# Patient Record
Sex: FEMALE | Race: BLACK OR AFRICAN AMERICAN | NOT HISPANIC OR LATINO | Employment: STUDENT | URBAN - METROPOLITAN AREA
[De-identification: names, ages, dates, MRNs, and addresses within clinical notes are randomized per-mention and may not be internally consistent; named-entity substitution may affect disease eponyms.]

---
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
## 2025-01-08 ENCOUNTER — OFFICE VISIT (OUTPATIENT)
Dept: FAMILY MEDICINE | Facility: CLINIC | Age: 20
End: 2025-01-08

## 2025-01-08 VITALS
HEART RATE: 80 BPM | HEIGHT: 69 IN | WEIGHT: 241 LBS | BODY MASS INDEX: 35.7 KG/M2 | DIASTOLIC BLOOD PRESSURE: 74 MMHG | RESPIRATION RATE: 16 BRPM | OXYGEN SATURATION: 100 % | TEMPERATURE: 98.5 F | SYSTOLIC BLOOD PRESSURE: 120 MMHG

## 2025-01-08 DIAGNOSIS — F33.1 MODERATE EPISODE OF RECURRENT MAJOR DEPRESSIVE DISORDER (H): ICD-10-CM

## 2025-01-08 DIAGNOSIS — Z23 NEED FOR VACCINATION: ICD-10-CM

## 2025-01-08 DIAGNOSIS — M62.830 SPASM OF MUSCLE OF LOWER BACK: Primary | ICD-10-CM

## 2025-01-08 DIAGNOSIS — J06.9 VIRAL URI WITH COUGH: ICD-10-CM

## 2025-01-08 PROBLEM — R45.851 DEPRESSION WITH SUICIDAL IDEATION: Status: RESOLVED | Noted: 2018-05-31 | Resolved: 2025-01-08

## 2025-01-08 PROBLEM — F32.A DEPRESSION WITH SUICIDAL IDEATION: Status: RESOLVED | Noted: 2018-05-31 | Resolved: 2025-01-08

## 2025-01-08 PROBLEM — A60.00 GENITAL HERPES SIMPLEX TYPE 1 INFECTION: Status: RESOLVED | Noted: 2021-09-22 | Resolved: 2025-01-08

## 2025-01-08 NOTE — LETTER
January 8, 2025      Snehalbrice WATSON Kush  [address] [facility] [address]        To Whom It May Concern:    Yessy WATSON Kush was seen in our clinic on 1/8/2025. Please excuse her from work on 1/8/2025 due to illness.        Sincerely,        Broderick Bhatia MD

## 2025-01-08 NOTE — PROGRESS NOTES
[facility] Note    Yessy Currie is a 19 year old female here for back pain.    Assessment & Plan     Spasm of muscle of lower back  Patient presents with subacute onset of low back pain.  Based on history and exam, diagnosis of muscle spasm 2/2 overuse with poor lifting technique   Works in [employer] 12 hours 5 days/week, uses back muscles to lift once legs fatigue  No red flag symptoms/signs at this time  Recommend the following:   Referral to physical therapy  Work activity modification, note provided to patient  Reviewed proper lifting technique   No muscle relaxer needed, sleeping without issue  Discussed return and ED precautions   After shared decision making, patient agreeable with plan   - Physical Therapy  Referral; Future    Viral URI with cough  Stable with acute onset past 2-3 days.  Symptoms: productive cough, sore throat  Diagnosis: viral URI. Low suspicion for 2/2 bacterial infection.   Recommended the following:  Supportive management with plenty of fluids  OTC medications PRN for symptom relief   Return and ED precautions reviewed  After shared decision making, patient agreeable with plan     Moderate episode of recurrent major depressive disorder (H)  Stable and improved from prior w/o active treatment plan. No longer with SI.  Discussed treatment options, patient declined for now  Continue to monitor     Need for vaccination  Counseled patient regarding recommended vaccines   After shared decision making, patient agreeable with plan   - INFLUENZA VACCINE,SPLIT VIRUS,TRIVALENT,PF(FLUZONE)    Shared decision making done during visit, options, concerns, and plan reviewed with the patient. All relevant questions and concerns have been addressed. The patient expressed understanding and is agreeable with the above plan as discussed. AVS provided to patient during office visit via hardcopy and/or MyChart.    Follow-up: Return if symptoms worsen or fail to  improve.    Broderick Bhatia MD 1/8/2025 10:16 AM    Note to patient: The 21st Century Cures Act makes medical notes like this available to patients in the interest of transparency. However, be advised this is a medical document. It is intended as [id number] communication. It is written in medical language and may contain abbreviations or verbiage that are unfamiliar. It may appear blunt or direct. Medical documents are intended to carry relevant information, facts as evident, and the clinical opinion of the practitioner.        Rajesh Mattson is a 19 year old, presenting for the following health issues:  Vaginal Problem (Lump next to pelvic area, reports it has resolved, may have been razor burn) and Back Pain        1/8/2025    10:02 AM   Additional Questions   Roomed by Riya     History of Present Illness       Back Pain:  She presents for follow up of back pain. Patient's back pain is a new problem.    Original cause of back pain: turning/bending  First noticed back pain: more than 1 month ago  Patient feels back pain: comes and goesLocation of back pain:  Right lower back and left lower back  Description of back pain: cramping, sharp and stabbing  Back pain spreads: right buttocks and left buttocks    Since patient first noticed back pain, pain is: always present, but gets better and worse  Does back pain interfere with her job:  Yes  On a scale of 1-10 (10 being the worst), patient describes pain as:  6  What makes back pain worse: bending, certain positions, standing and twisting   Acupuncture: not tried  Acetaminophen: not tried  Activity or exercise: not helpful  Chiropractor:  Not tried  Cold: not helpful  Heat: helpful  Massage: not helpful  Muscle relaxants: not tried  NSAIDS: helpful  Opioids: not tried  Physical Therapy: not tried  Rest: helpful  Steroid Injection: not tried  Stretching: helpful  Surgery: not tried  TENS unit: not tried  Topical pain relievers: helpful  Other healthcare providers  "patient is seeing for back pain: None   She is taking medications regularly.     Additional history:  The back pain has been ongoing. 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   Constantly using bad technique to lift tires  No known injury or moment of sudden pain  When pain starts, takes 20-30 minutes to recover   Described as sharp and achy pain in low back  Able to sleep fine  Works at [employer]    Has a cold. Runny nose, congestion, cough. For about 2-3 days. She would like an anitbiotic because she is going out of town in several days. With sore throat, getting better. No known sick contacts. No fevers.   Denies headaches, body aches, chills.    Pelvic lump has resolved. Has very sensitvie skin. Gets razor burn when shaving often.     Review of Systems:  Please refer to HPI for pertinent positives and negatives.           Objective    /74   Pulse 80   Temp 98.5  F (36.9  C) (Oral)   Resp 16   Ht 1.753 m (5' 9\")   Wt 109.3 kg (241 lb)   LMP 10/06/2024 (Approximate)   SpO2 100%   BMI 35.59 kg/m    Body mass index is 35.59 kg/m .    Physical Exam  Vitals reviewed.   Constitutional:       General: She is not in acute distress.     Appearance: Normal appearance. She is not ill-appearing or diaphoretic.   HENT:      Head: Normocephalic and atraumatic.      Nose: Congestion present.      Mouth/Throat:      Mouth: Mucous membranes are moist.      Pharynx: Oropharynx is clear.   Eyes:      Conjunctiva/sclera: Conjunctivae normal.   Cardiovascular:      Rate and Rhythm: Normal rate and regular rhythm.      Heart sounds: Normal heart sounds.   Pulmonary:      Effort: Pulmonary effort is normal. No respiratory distress.      Breath sounds: Normal breath sounds. No wheezing, rhonchi or rales.   Musculoskeletal:      Cervical back: Normal.      Thoracic back: Normal.      Lumbar back: Spasms and tenderness present. No swelling, deformity, signs of trauma or bony tenderness. Normal " range of motion. Negative right straight leg raise test and negative left straight leg raise test.      Comments: Noted TTP to right sided paraspinal muscles of lumbar region.   Lymphadenopathy:      Cervical: Cervical adenopathy present.   Skin:     General: Skin is warm and dry.   Neurological:      Mental Status: She is alert.      Sensory: No sensory deficit.      Motor: No weakness.      Gait: Gait normal.   Psychiatric:         Mood and Affect: Mood normal.         Behavior: Behavior normal.            Signed Electronically by: Broderick Bhatia MD

## 2025-01-08 NOTE — LETTER
January 8, 2025      Yessy Currie  [address] [facility] [address]        To Whom It May Concern:    Yessy Currie was seen in our clinic on 1/8/2025. 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        Sincerely,      Broderick Bhatia      Electronically signed

## 2025-01-08 NOTE — PATIENT INSTRUCTIONS
Srinivasan,    Thank you for allowing [facility] to manage your care today.    Our plan is as follows:    I made a referral to physical therapy, they will be calling in approximately 1 week to set up your appointment. If you do not hear from them, please call the specialty number on your after visit.     If you have any questions or concerns, please feel free to call us at [phone].    Your partner in health,    Dr. Bhatia      Did you know?    You can schedule a video visit for follow-up appointments as well as future appointments for certain conditions. Please see the below link.     [email or web address]    If you have not already done so, I encourage you to sign up for [facility] ([email or web address]). This will allow you to review your results, securely communicate with a provider, and schedule virtual visits as well.

## 2025-01-21 ENCOUNTER — VIRTUAL VISIT (OUTPATIENT)
Dept: FAMILY MEDICINE | Facility: CLINIC | Age: 20
End: 2025-01-21
Payer: COMMERCIAL

## 2025-01-21 DIAGNOSIS — R22.2 MASS OF SKIN OF BACK: ICD-10-CM

## 2025-01-21 DIAGNOSIS — J06.9 VIRAL URI WITH COUGH: Primary | ICD-10-CM

## 2025-01-21 PROCEDURE — 98005 SYNCH AUDIO-VIDEO EST LOW 20: CPT | Performed by: STUDENT IN AN ORGANIZED HEALTH CARE EDUCATION/TRAINING PROGRAM

## 2025-01-21 ASSESSMENT — ENCOUNTER SYMPTOMS: COUGH: 1

## 2025-01-21 ASSESSMENT — PATIENT HEALTH QUESTIONNAIRE - PHQ9
SUM OF ALL RESPONSES TO PHQ QUESTIONS 1-9: 14
10. IF YOU CHECKED OFF ANY PROBLEMS, HOW DIFFICULT HAVE THESE PROBLEMS MADE IT FOR YOU TO DO YOUR WORK, TAKE CARE OF THINGS AT HOME, OR GET ALONG WITH OTHER PEOPLE: VERY DIFFICULT

## 2025-01-21 NOTE — LETTER
January 21, 2025      Snehalbrice WATSON Kush  [address] [facility] [address]        To Whom It May Concern:      Yessy WATSON Kush was seen in our clinic on 1/8/2025 for the following medical issues:   viral upper respiratory infection and cough with nasal congestion and body aches.         Sincerely,        Broderick Bhatia MD    Electronically signed

## 2025-01-21 NOTE — PROGRESS NOTES
Srinivasan is a 19 year old who is being evaluated via a billable video visit.    How would you like to obtain your AVS? [employer] [facility] [employer]  If the video visit is dropped, the invitation should be resent by: Text to cell phone: [phone]  Will anyone else be joining your video visit? Sue      Yessy Currie is a 19 year old female here for work note and painful skin lump.    Assessment & Plan     Viral URI with cough  Stable and improving past 2 weeks  Symptoms: dry cough   Diagnosis: viral URI. Low suspicion for 2/2 bacterial infection.   Recommended the following:  Supportive management with plenty of fluids  OTC medications PRN for symptom relief   Return precautions reviewed  After shared decision making, patient agreeable with plan   Needs work note sent via [employer]    Mass of skin of back  Patient presents with acute onset of tender skin mass of the mid back.  Based on history and exam, Ddx includes but is not limited to: abscess vs inflamed cyst vs other  Recommend the following:  Schedule procedure appt for evaluation   After shared decision making, patient agreeable with plan     Shared decision making done during visit, options, concerns, and plan reviewed with the patient. All relevant questions and concerns have been addressed. The patient expressed understanding and is agreeable with the above plan as discussed. AVS provided to patient via [employer].    Follow-up: Return in about 9 days (around 1/30/2025) for procedure visit.    [facility]  add-on complexity code:   The longitudinal plan of care for the diagnosis(es)/condition(s) as documented were addressed during this visit. Due to the added complexity in care, I will continue to support Srinivasan in the subsequent management and with ongoing continuity of care.     Broderick Bhatia MD 1/21/2025 4:04 PM    Note to patient: The 21st Century Cures Act makes medical notes like this available to patients in the interest of transparency. However, be advised this is a medical  document. It is intended as velc-bj-lmdo communication. It is written in medical language and may contain abbreviations or verbiage that are unfamiliar. It may appear blunt or direct. Medical documents are intended to carry relevant information, facts as evident, and the clinical opinion of the practitioner.         Subjective   Srinivasan is a 19 year old, presenting for the following health issues:  Cough        1/21/2025     3:30 PM   Additional Questions   Roomed by herminio mendes ma   Accompanied by self         1/21/2025     3:30 PM   Patient Reported Additional Medications   Patient reports taking the following new medications none       Video Start Time:  4:05 PM    HPI     #Cough  Patient states that she is currently having dry coughs, most of the other symptoms she had went away.   Denies fevers, sick contacts, SOB, chest pain  Needs work note     #Bug bite  She also mentioned that on her mid back, there looks to be a bug bite   Painful and squishy, feels like a big pimple  Denies fever, drainage    Review of Systems: Please refer to HPI for pertinent positives and negatives.           Objective       Vitals:  No vitals were obtained today due to virtual visit.    Physical Exam   GENERAL: alert and no distress  EYES: Eyes grossly normal to inspection.  No discharge or erythema, or obvious scleral/conjunctival abnormalities.  RESP: No audible wheeze, cough, or visible cyanosis.    SKIN: on mid left back visualized a small round mass ~1 cm, tender and fluctuant to patient's touch  NEURO: Cranial nerves grossly intact.  Mentation and speech appropriate for age.  PSYCH: Appropriate affect, tone, and pace of words        Video-Visit Details  Type of service:  Video Visit   Video End Time:4:24 PM  Originating Location (pt. Location): Home    Distant Location (provider location):  On-site  Platform used for Video Visit: Josh  Signed Electronically by: Broderick Bhatia MD

## 2025-01-29 ENCOUNTER — VIRTUAL VISIT (OUTPATIENT)
Dept: PSYCHIATRY | Facility: CLINIC | Age: 20
End: 2025-01-29
Payer: COMMERCIAL

## 2025-01-29 DIAGNOSIS — F32.A DEPRESSION, UNSPECIFIED DEPRESSION TYPE: ICD-10-CM

## 2025-01-29 DIAGNOSIS — F90.9 ATTENTION DEFICIT HYPERACTIVITY DISORDER (ADHD), UNSPECIFIED ADHD TYPE: ICD-10-CM

## 2025-01-29 DIAGNOSIS — G47.9 SLEEP DISTURBANCE: Primary | ICD-10-CM

## 2025-01-29 RX ORDER — CLONIDINE HYDROCHLORIDE 0.3 MG/1
0.3 TABLET ORAL AT BEDTIME
Qty: 30 TABLET | Refills: 2 | Status: SHIPPED | OUTPATIENT
Start: 2025-01-29

## 2025-01-29 ASSESSMENT — PAIN SCALES - GENERAL: PAINLEVEL_OUTOF10: NO PAIN (0)

## 2025-01-29 ASSESSMENT — PATIENT HEALTH QUESTIONNAIRE - PHQ9: SUM OF ALL RESPONSES TO PHQ QUESTIONS 1-9: 14

## 2025-01-29 NOTE — PATIENT INSTRUCTIONS
Medication Plan  -Discontinue clonidine 0.1mg ER at bedtime - never increased to 0.1mg BID  -Restart clonidine 0.3mg nightly. Recommend nightly use.    **For crisis resources, please see the information at the end of this document**   Patient Education    Thank you for coming to the [facility] [place] [facility].     Lab Testing:  If you had lab testing today and your results are reassuring or normal they will be mailed to you or sent through [employer] within 7 days. If the lab tests need quick action we will call you with the results. The phone number we will call with results is # [phone]. If this is not the best number please call our clinic and change the number.     Medication Refills:  If you need any refills please call your pharmacy and they will contact us.   Three business days of notice are needed for general medication refill requests.   Five business days of notice are needed for controlled substance refill requests.   If you need to change to a different pharmacy, please contact the new pharmacy directly. The new pharmacy will help you get your medications transferred.     Contact Us:  Please call [phone] during business hours (8-5:00 M-F).   If you have medication related questions after clinic hours, or on the weekend, please call [phone].     Financial Assistance [phone]   Medical Records [phone]       MENTAL HEALTH CRISIS RESOURCES:  For a emergency help, please call 911 or go to the nearest Emergency Department.     Emergency Walk-In Options:   [facility] Unit @ [facility] ([place]): [phone] - Specialized mental health emergency area designed to be calming  [facility] ([place]): [phone]  [facility] ([place]): [phone]  [facility]): [phone]    [facility] Crisis Information:   [place]: [phone]  [place]: [phone]  [place] ([place]) - Adult: [phone]      Child: [phone]  [place] - Adult: [phone]     Child: [phone]  [place]: [phone]  List of all [facility] resources:   https://[place].gov/dhs/people-we-serve/adults/health-care/mental-health/resources/crisis-contacts.jsp    National Crisis Information:   Crisis Text Line: Text  [place]  to 817831  Suicide & Crisis Lifeline: 988  National Suicide Prevention Lifeline: 4-625-135-TALK (1-603.827.5447)       For online chat options, visit https://suicidepreventionlifeline.org/chat/  Poison Control Center: 2-255-631-5547  Trans Lifeline: 1-855.480.7709 - Hotline for transgender people of all ages  The [employer] Project: 1-088-376-9434 - Hotline for LGBT youth     For Non-Emergency Support:   Fast Tracker: Mental Health & Substance Use Disorder Resources -   https://www.[employer].org/

## 2025-01-29 NOTE — PROGRESS NOTES
Virtual Visit Details    Type of service:  Video Visit   Video Start Time: 11:37A  Video End Time: 11:56A    Originating Location (pt. Location): Home    Distant Location (provider location):  Off-site  Platform used for Video Visit: Josh

## 2025-01-29 NOTE — NURSING NOTE
Current patient location: [address] [place] [id number]    Is the patient currently in the state of [place]? YES    Visit mode: VIDEO    If the visit is dropped, the patient can be reconnected by:VIDEO VISIT: Text to cell phone:   Telephone Information:   Mobile [phone]       Will anyone else be joining the visit? NO  (If patient encounters technical issues they should call [phone])    Are changes needed to the allergy or medication list? Pt stated no changes to allergies and Pt stated no med changes    Are refills needed on medications prescribed by this physician? Discuss with provider    Rooming Documentation:  Questionnaire(s) completed    Reason for visit: MAURY CELAYAF
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
## 2025-03-25 NOTE — PROGRESS NOTES
[facility]    Behavioral Health Clinician Progress Note   Mental Health & Addiction Services      2025    Patient Name: Yessy Currie       Service Type:  Individual   Service Location:  MyChart / Email (patient reached)   Visit Start Time: 11:11 AM  Visit End Time:  11:22 AM   Session Length: 11 min    Attendees: Patient   Service Modality: Video Visit:      Provider verified identity through the following two step process.  Patient provided:  Patient photo and Patient     Telemedicine Visit: The patient's condition can be safely assessed and treated via synchronous audio and visual telemedicine encounter.      Reason for Telemedicine Visit: Patient convenience (e.g. access to timely appointments / distance to available provider)    Originating Site (Patient Location): Patient's home    Distant Site (Provider Location): Provider Remote Setting- Home Office    Consent:  The patient/guardian has verbally consented to: the potential risks and benefits of telemedicine (video visit) versus in person care; bill my insurance or make self-payment for services provided; and responsibility for payment of non-covered services.     Patient would like the video invitation sent by:  My Chart    Mode of Communication:  Video Conference via [facility]    Distant Location (Provider):  Off-site    As the provider I attest to compliance with applicable laws and regulations related to telemedicine.   Visit number: 1    [facility] Visit Activities (Refresh list every visit): [facility] Only     [place] Diagnostic Assessment: 2024 by Alesha COOK   Treatment Plan Review Date: 2025      DATA:    Extended Session (60+ minutes): No   Interactive Complexity: No   Crisis: No   [facility] Patient: No     Assessments completed:  The following assessments were completed by patient for this visit:  PHQ2: No questionnaires on file.  PHQ9:       10/9/2024    12:28 PM  11/26/2024     9:08 AM 12/12/2024     1:11 PM 1/21/2025     3:37 PM 1/21/2025     3:40 PM 1/29/2025    11:19 AM 3/26/2025    11:01 AM   PHQ-9 SCORE   PHQ-9 Total Score Jamiehart 15 (Moderately severe depression) 15 (Moderately severe depression) 15 (Moderately severe depression) 14 (Moderate depression)   15 (Moderately severe depression)   PHQ-9 Total Score 15 15  15   18 14 15        Patient-reported     GAD2:       1/8/2025    11:11 AM   DANNIELLE-2   Feeling nervous, anxious, or on edge 1   Not being able to stop or control worrying 1   DANNIELLE-2 Total Score 2        Patient-reported     GAD7:       7/25/2018     1:41 PM 6/10/2019    10:14 AM 2/5/2020     9:37 AM 10/9/2024    12:29 PM 11/26/2024     9:09 AM 12/12/2024     1:12 PM 3/26/2025    11:01 AM   DANNIELLE-7 SCORE   Total Score    14 (moderate anxiety) 14 (moderate anxiety) 13 (moderate anxiety) 17 (severe anxiety)   Total Score 12 10 7 14 14  13  17        Patient-reported        Reason for Visit/Presenting Concern:  ADHD, Depression, and Insomnia    Current Stressors / Issues:   MH update: Pt reports that her anxiety has been high and she's feeling more irritated. [facility] explored triggers for anxious. Situational, RLS. The medication makes her tired so she's only taking it for sleep. [facility] explored how her attention interferes in her life. Impulsiveness, forgetful, procrastinates a lot. [facility] explored skills she's used to help with forgetfulness. Pt reports that she often has trouble falling and staying asleep which the clonodine helps with. Pt reports that her work is stressful, has been injured at work several times. She is actively looking for a different job. Barriers: lack of transportation so she uses ride sharing which is expensive.      Stresses: work, looking for a different job, needs to buy a car  Appetite: unremarkable  Sleep: trouble falling and staying asleep  Therapy: No, not comfortable in therapy.  KARLOS: No  Preg:  Most important: medication update       Therapeutic Interventions:  Cognitive Behavioral Therapy (CBT): Identified and challenged maladaptive patterns of behavior and thinking that interfere with patients life  Psycho-education: Provided psycho-education about patient's behavioral health condition and symptoms.  Mindfulness: Provided psycho-education around developing mindfulness strategies.     Response to treatment interventions:   Patient was receptive to interventions utilized.  Patient was engaged in the therapy process.       Progress on Treatment Objective(s) / Homework:   Unable to assess-first appt      Medication Review:   No changes to current psychiatric medication(s)     Medication Compliance:   Yes     Chemical Use Review:  Substance Use: Chemical use reviewed, no active concerns identified      Tobacco Use: No current tobacco use.       Assessment: Current Emotional / Mental Status (status of significant symptoms):    Risk status (Self / Other harm or suicidal ideation)   Patient denies a history of suicidal ideation, suicide attempts, self-injurious behavior, homicidal ideation, homicidal behavior, and and other safety concerns   Patient denies current fears or concerns for personal safety.   Patient denies current or recent suicidal ideation or behaviors.   Patient denies current or recent homicidal ideation or behaviors.   Patient denies current or recent self injurious behavior or ideation.   Patient denies other safety concerns.   Recommended that patient call 911 or go to the local ED should there be a change in any of these risk factors      ASSESSMENT:   Mental Status:     Appearance:   Appropriate    Eye Contact:   Good    Psychomotor Behavior: Normal    Attitude:   Cooperative    Orientation:   All   Speech Rate / Production: Normal    Volume:   Normal    Mood:    Depressed    Affect:    Appropriate    Thought Content:  Clear    Thought Form:  Coherent  Logical    Insight:    Good          Diagnoses:      Depression, major,  recurrent, moderate (H)  Attention deficit hyperactivity disorder (ADHD), unspecified ADHD type    Collateral Reports Completed:   Collaborated with [facility] team        Plan: (Homework, other):   Patient was provided No indications of CD issues  Patient was given information about behavioral services and encouraged to schedule a follow up appointment with the clinic [facility] in 1 month.         Meenu Quinteros, [facility], [facility]     _____________________________________________________________________________________________________________________________________                                              Individual Treatment Plan    Patient's Name: Yessy Currei   YOB: 2005  Date of Creation: 03/25/2025  Date Treatment Plan Last Reviewed/Revised: pending    DSM5 Diagnoses: Attention-Deficit/Hyperactivity Disorder  314.01 (F90.9) Unspecified Attention -Deficit / Hyperactivity Disorder or 296.32 (F33.1) Major Depressive Disorder, Recurrent Episode, Moderate With anxious distress  Psychosocial / Contextual Factors: Occupational Issues  PROMIS (reviewed every 90 days):   The following assessments were completed by patient for this visit:  PROMIS 10-Global Health (all questions and answers displayed):       11/26/2024     9:31 AM 3/26/2025    11:02 AM   PROMIS 10   In general, would you say your health is: Fair Very good   In general, would you say your quality of life is: Good Good   In general, how would you rate your physical health? Good Good   In general, how would you rate your mental health, including your mood and your ability to think? Good Good   In general, how would you rate your satisfaction with your social activities and relationships? Very good Fair   In general, please rate how well you carry out your usual social activities and roles Good Poor   To what extent are you able to carry out your everyday physical activities such as walking, climbing stairs, carrying groceries, or moving a chair?  Completely Mostly   In the past 7 days, how often have you been bothered by emotional problems such as feeling anxious, depressed, or irritable? Always Always   In the past 7 days, how would you rate your fatigue on average? Severe Severe   In the past 7 days, how would you rate your pain on average, where 0 means no pain, and 10 means worst imaginable pain? 6 7   In general, would you say your health is: 2 4   In general, would you say your quality of life is: 3 3   In general, how would you rate your physical health? 3 3   In general, how would you rate your mental health, including your mood and your ability to think? 3 3   In general, how would you rate your satisfaction with your social activities and relationships? 4 2   In general, please rate how well you carry out your usual social activities and roles. (This includes activities at home, at work and in your community, and responsibilities as a parent, child, spouse, employee, friend, etc.) 3 1   To what extent are you able to carry out your everyday physical activities such as walking, climbing stairs, carrying groceries, or moving a chair? 5 4   In the past 7 days, how often have you been bothered by emotional problems such as feeling anxious, depressed, or irritable? 5 5   In the past 7 days, how would you rate your fatigue on average? 4 4   In the past 7 days, how would you rate your pain on average, where 0 means no pain, and 10 means worst imaginable pain? 6 7   Global Mental Health Score 11  9    Global Physical Health Score 13  11    PROMIS TOTAL - SUBSCORES 24  20        Patient-reported     PROMIS 10-Global Health (only subscores and total score):       11/26/2024     9:31 AM 3/26/2025    11:02 AM   PROMIS-10 Scores Only   Global Mental Health Score 11  9    Global Physical Health Score 13  11    PROMIS TOTAL - SUBSCORES 24  20        Patient-reported        Referral / Collaboration:  Collaborated with [facility] team .    Anticipated number of session  for this episode of care:  6-9 sessions  Anticipation frequency of session: Monthly  Anticipated Duration of each session: 16-37 minutes  Treatment plan will be reviewed in 90 days or when goals have been changed.       MeasurableTreatment Goal(s) related to diagnosis / functional impairment(s)  Goal 1: Patient will reduce frequency and intensity of depressive episode(s).    I will know I've met my goal when I am feeling more chill.      Status: New - Date: 03/25/2025      Intervention(s)  [facility] will Cognitive Behavioral Therapy (CBT): Identify and challenge maladaptive patterns of behavior and thinking that interfere with patient's life  Psycho-education: Provide psycho-education about patient's behavioral health condition and symptoms.  Mindfulness: Provide psycho-education around developing mindfulness strategies.        Patient has reviewed and agreed to the above plan.     Written by  CARLYN Patton, [facility]

## 2025-03-26 ENCOUNTER — VIRTUAL VISIT (OUTPATIENT)
Dept: PSYCHIATRY | Facility: CLINIC | Age: 20
End: 2025-03-26
Payer: COMMERCIAL

## 2025-03-26 ENCOUNTER — VIRTUAL VISIT (OUTPATIENT)
Dept: BEHAVIORAL HEALTH | Facility: CLINIC | Age: 20
End: 2025-03-26
Payer: COMMERCIAL

## 2025-03-26 DIAGNOSIS — F90.9 ATTENTION DEFICIT HYPERACTIVITY DISORDER (ADHD), UNSPECIFIED ADHD TYPE: Primary | ICD-10-CM

## 2025-03-26 DIAGNOSIS — F33.1 DEPRESSION, MAJOR, RECURRENT, MODERATE (H): Primary | ICD-10-CM

## 2025-03-26 DIAGNOSIS — F32.A DEPRESSION, UNSPECIFIED DEPRESSION TYPE: ICD-10-CM

## 2025-03-26 DIAGNOSIS — F90.9 ATTENTION DEFICIT HYPERACTIVITY DISORDER (ADHD), UNSPECIFIED ADHD TYPE: ICD-10-CM

## 2025-03-26 PROCEDURE — 99207 PR NO BILLABLE SERVICE THIS VISIT: CPT | Mod: 95 | Performed by: SOCIAL WORKER

## 2025-03-26 RX ORDER — METHYLPHENIDATE HYDROCHLORIDE 18 MG/1
18 TABLET ORAL EVERY MORNING
Qty: 30 TABLET | Refills: 0 | Status: SHIPPED | OUTPATIENT
Start: 2025-03-26

## 2025-03-26 ASSESSMENT — ANXIETY QUESTIONNAIRES
8. IF YOU CHECKED OFF ANY PROBLEMS, HOW DIFFICULT HAVE THESE MADE IT FOR YOU TO DO YOUR WORK, TAKE CARE OF THINGS AT HOME, OR GET ALONG WITH OTHER PEOPLE?: VERY DIFFICULT
IF YOU CHECKED OFF ANY PROBLEMS ON THIS QUESTIONNAIRE, HOW DIFFICULT HAVE THESE PROBLEMS MADE IT FOR YOU TO DO YOUR WORK, TAKE CARE OF THINGS AT HOME, OR GET ALONG WITH OTHER PEOPLE: VERY DIFFICULT
6. BECOMING EASILY ANNOYED OR IRRITABLE: NEARLY EVERY DAY
7. FEELING AFRAID AS IF SOMETHING AWFUL MIGHT HAPPEN: SEVERAL DAYS
7. FEELING AFRAID AS IF SOMETHING AWFUL MIGHT HAPPEN: SEVERAL DAYS
GAD7 TOTAL SCORE: 17
5. BEING SO RESTLESS THAT IT IS HARD TO SIT STILL: MORE THAN HALF THE DAYS
3. WORRYING TOO MUCH ABOUT DIFFERENT THINGS: NEARLY EVERY DAY
GAD7 TOTAL SCORE: 17
4. TROUBLE RELAXING: NEARLY EVERY DAY
GAD7 TOTAL SCORE: 17
1. FEELING NERVOUS, ANXIOUS, OR ON EDGE: NEARLY EVERY DAY
2. NOT BEING ABLE TO STOP OR CONTROL WORRYING: MORE THAN HALF THE DAYS

## 2025-03-26 ASSESSMENT — PATIENT HEALTH QUESTIONNAIRE - PHQ9
10. IF YOU CHECKED OFF ANY PROBLEMS, HOW DIFFICULT HAVE THESE PROBLEMS MADE IT FOR YOU TO DO YOUR WORK, TAKE CARE OF THINGS AT HOME, OR GET ALONG WITH OTHER PEOPLE: VERY DIFFICULT
10. IF YOU CHECKED OFF ANY PROBLEMS, HOW DIFFICULT HAVE THESE PROBLEMS MADE IT FOR YOU TO DO YOUR WORK, TAKE CARE OF THINGS AT HOME, OR GET ALONG WITH OTHER PEOPLE: VERY DIFFICULT
SUM OF ALL RESPONSES TO PHQ QUESTIONS 1-9: 15

## 2025-03-26 ASSESSMENT — PAIN SCALES - GENERAL: PAINLEVEL_OUTOF10: NO PAIN (0)

## 2025-03-26 NOTE — NURSING NOTE
Is the patient currently in the state of [place]? YES    Current patient location: [facility] [address] [facility] [address]    Visit mode:Video    If the visit is dropped, the patient can be reconnected by: VIDEO VISIT: Text to cell phone:   Telephone Information:   Mobile [phone]       Will anyone else be joining the visit? No  (If patient encounters technical issues they should call [phone])    Are changes needed to the allergy or medication list? No    Are refills needed on medications prescribed by this physician? No    Rooming Documentation: Unable to complete qnrs due to time.    Reason for visit: RECHECK     Cara Gaston F      Care team has reviewed attendance agreement with patient. Patient advised that two failed appointments within 6 months may lead to termination of current episode of care.

## 2025-03-26 NOTE — PATIENT INSTRUCTIONS
Plan:  -Continue clonidine 0.3mg nightly  -Start Concerta 18mg every morning. Check your blood pressure at the pharmacy after starting this medication and immediately report any reading above 140/90.     **For crisis resources, please see the information at the end of this document**   Patient Education    Thank you for coming to the [facility] [place] [facility].     Lab Testing:  If you had lab testing today and your results are reassuring or normal they will be mailed to you or sent through in3Dgallery within 7 days. If the lab tests need quick action we will call you with the results. The phone number we will call with results is # [phone]. If this is not the best number please call our clinic and change the number.     Medication Refills:  If you need any refills please call your pharmacy and they will contact us.   Three business days of notice are needed for general medication refill requests.   Five business days of notice are needed for controlled substance refill requests.   If you need to change to a different pharmacy, please contact the new pharmacy directly. The new pharmacy will help you get your medications transferred.     Contact Us:  Please call [phone] during business hours (8-5:00 M-F).     Financial Assistance [phone]   Medical Records [phone]       MENTAL HEALTH CRISIS RESOURCES:  For a emergency help, please call 911 or go to the nearest Emergency Department.     Emergency Walk-In Options:   [facility] Unit @ [facility] ([place]): [phone] - Specialized mental health emergency area designed to be calming  [facility] ([place]): [phone]  [facility] ([place]): [phone]  [facility]): [phone]    [facility] Crisis Information:   [place]: [phone]  [place]: [phone]  [place] ([place]) - Adult: [phone]     Child: [phone]  [place] - Adult: [phone]      [facility]: [phone]  [place]: [phone]  List of all [facility] resources:   https://[place].gov/dhs/people-we-serve/adults/health-care/mental-health/resources/crisis-contacts.jsp    National Crisis Information:   Crisis Text Line: Text  [place]  to 510088  Suicide & Crisis Lifeline: 988  National Suicide Prevention Lifeline: 7-168-728-TALK (1-621.959.2050)       For online chat options, visit https://suicidepreventionlifeline.org/chat/  Poison Control Center: 8-105-853-2767  Trans Lifeline: 1-821.836.7754 - Hotline for transgender people of all ages  The [employer]: 1-291-148-0707 - Hotline for LGBT youth     For Non-Emergency Support:   Fast Tracker: Mental Health & Substance Use Disorder Resources -   https://www.[employer].org/

## 2025-03-26 NOTE — NURSING NOTE
Is the patient currently in the state of [place]? YES    Current patient location: [address] [place] [address]    Visit mode:Video    If the visit is dropped, the patient can be reconnected by: VIDEO VISIT: Text to cell phone:   Telephone Information:   Mobile [phone]       Will anyone else be joining the visit? No  (If patient encounters technical issues they should call [phone])    Are changes needed to the allergy or medication list? No    Are refills needed on medications prescribed by this physician? No    Rooming Documentation: Unable to complete qnrs due to time.    Reason for visit: RECHECK     MARLENE Richards

## 2025-03-26 NOTE — PROGRESS NOTES
Virtual Visit Details    Type of service:  Video Visit   Video Start Time: 1132  Video End Time: 1152    Originating Location (pt. Location): Home    Distant Location (provider location):  Off-site  Platform used for Video Visit: [email or web address]
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
## 2025-04-04 ENCOUNTER — OFFICE VISIT (OUTPATIENT)
Dept: FAMILY MEDICINE | Facility: CLINIC | Age: 20
End: 2025-04-04
Payer: COMMERCIAL

## 2025-04-04 VITALS
HEART RATE: 75 BPM | HEIGHT: 69 IN | RESPIRATION RATE: 18 BRPM | TEMPERATURE: 97.3 F | WEIGHT: 243.4 LBS | SYSTOLIC BLOOD PRESSURE: 124 MMHG | BODY MASS INDEX: 36.05 KG/M2 | OXYGEN SATURATION: 98 % | DIASTOLIC BLOOD PRESSURE: 76 MMHG

## 2025-04-04 DIAGNOSIS — Z30.09 ENCOUNTER FOR OTHER GENERAL COUNSELING OR ADVICE ON CONTRACEPTION: Primary | ICD-10-CM

## 2025-04-04 DIAGNOSIS — Z30.9 CONTRACEPTIVE MANAGEMENT: Primary | ICD-10-CM

## 2025-04-04 DIAGNOSIS — R51.9 NONINTRACTABLE EPISODIC HEADACHE, UNSPECIFIED HEADACHE TYPE: ICD-10-CM

## 2025-04-04 LAB
ALBUMIN SERPL BCG-MCNC: 4.5 G/DL (ref 3.5–5.2)
ALP SERPL-CCNC: 68 U/L (ref 40–150)
ALT SERPL W P-5'-P-CCNC: 19 U/L (ref 0–50)
ANION GAP SERPL CALCULATED.3IONS-SCNC: 11 MMOL/L (ref 7–15)
AST SERPL W P-5'-P-CCNC: 23 U/L (ref 0–35)
BILIRUB SERPL-MCNC: 0.5 MG/DL
BUN SERPL-MCNC: 12.9 MG/DL (ref 6–20)
CALCIUM SERPL-MCNC: 9.2 MG/DL (ref 8.8–10.4)
CHLORIDE SERPL-SCNC: 103 MMOL/L (ref 98–107)
CREAT SERPL-MCNC: 0.72 MG/DL (ref 0.51–0.95)
EGFRCR SERPLBLD CKD-EPI 2021: >90 ML/MIN/1.73M2
ERYTHROCYTE [DISTWIDTH] IN BLOOD BY AUTOMATED COUNT: 12.9 % (ref 10–15)
GLUCOSE SERPL-MCNC: 96 MG/DL (ref 70–99)
HCG UR QL: NEGATIVE
HCO3 SERPL-SCNC: 22 MMOL/L (ref 22–29)
HCT VFR BLD AUTO: 42.3 % (ref 35–47)
HGB BLD-MCNC: 13.5 G/DL (ref 11.7–15.7)
MCH RBC QN AUTO: 26.4 PG (ref 26.5–33)
MCHC RBC AUTO-ENTMCNC: 31.9 G/DL (ref 31.5–36.5)
MCV RBC AUTO: 83 FL (ref 78–100)
PLATELET # BLD AUTO: 191 10E3/UL (ref 150–450)
POTASSIUM SERPL-SCNC: 4.1 MMOL/L (ref 3.4–5.3)
PROT SERPL-MCNC: 7.6 G/DL (ref 6.4–8.3)
RBC # BLD AUTO: 5.12 10E6/UL (ref 3.8–5.2)
SODIUM SERPL-SCNC: 136 MMOL/L (ref 135–145)
TSH SERPL DL<=0.005 MIU/L-ACNC: 1.96 UIU/ML (ref 0.5–4.3)
WBC # BLD AUTO: 5.2 10E3/UL (ref 4–11)

## 2025-04-04 PROCEDURE — 90620 MENB-4C VACCINE IM: CPT

## 2025-04-04 PROCEDURE — 80053 COMPREHEN METABOLIC PANEL: CPT

## 2025-04-04 PROCEDURE — 85027 COMPLETE CBC AUTOMATED: CPT

## 2025-04-04 PROCEDURE — 90471 IMMUNIZATION ADMIN: CPT

## 2025-04-04 PROCEDURE — 36415 COLL VENOUS BLD VENIPUNCTURE: CPT

## 2025-04-04 PROCEDURE — 81025 URINE PREGNANCY TEST: CPT

## 2025-04-04 PROCEDURE — 84443 ASSAY THYROID STIM HORMONE: CPT

## 2025-04-04 PROCEDURE — 99214 OFFICE O/P EST MOD 30 MIN: CPT | Mod: 25

## 2025-04-04 ASSESSMENT — PAIN SCALES - GENERAL: PAINLEVEL_OUTOF10: MODERATE PAIN (4)

## 2025-04-04 NOTE — PROGRESS NOTES
"  Assessment & Plan     Contraceptive management  - Pregnancy test negative. Discussed different birth control options, she would like to have nexplanon removed. She is interested in the nuvaring but does not want to be sexually active with this. Discussed OCP for birth control, she is interested in the mini pill.  - HCG qualitative urine - CSC and Range    Nonintractable episodic headache, unspecified headache type  - Chronic, intermittent headaches. Denies known trauma.  - Comprehensive metabolic panel (BMP + Alb, Alk Phos, ALT, AST, Total. Bili, TP)  - TSH with free T4 reflex  - CBC with platelets          BMI  Estimated body mass index is 35.94 kg/m  as calculated from the following:    Height as of this encounter: 1.753 m (5' 9\").    Weight as of this encounter: 110.4 kg (243 lb 6.4 oz).   {Weight Management Plan needed for ACO:821807}      {FOLLOW UP PLANS (Optional) Includes COVID19 Treatment Plan:429395}    Rajesh Mattson is a 19 year old, presenting for the following health issues:  Contraception        4/4/2025     1:53 PM   Additional Questions   Roomed by Negra DURBIN MA     History of Present Illness       Reason for visit:  Pregnacy test She is missing 4 dose(s) of medications per week.  She is not taking prescribed medications regularly due to remembering to take.      Discuss contraception options, pt is wanting to get nexplanon removed.     Trying to lose weight, not possible with the nexplanon.    {SUPERLIST (Optional):728693}  {additonal problems for provider to add (Optional):870908}    {ROS Picklists (Optional):248948}      Objective    LMP 02/04/2025 (Approximate)   Breastfeeding No   There is no height or weight on file to calculate BMI.  Physical Exam   GENERAL: alert and no distress  NECK: no adenopathy, no asymmetry, masses, or scars  RESP: lungs clear to auscultation - no rales, rhonchi or wheezes  CV: regular rate and rhythm, normal S1 S2, no S3 or S4, no murmur, click or rub, no " peripheral edema  MS: no gross musculoskeletal defects noted, no edema    {Diagnostic Test Results (Optional):197435}  HANSEL Valerio      Signed Electronically by: JOEY Patton CNP  {Email feedback regarding this note to [email or web address]   :[id number]}

## 2025-04-04 NOTE — PATIENT INSTRUCTIONS
Schedule a virtual visit with Peter in a few weeks for evaluation of headaches     For Nexplanon removal schedule an appointment with Dr. Garcia, Dr. Daniels, Tracey Bailey in 3 months     Plan for Mini pill once nexplanon is removed. If you have any other questions please schedule a follow-up in person or virtual.

## 2025-04-05 ENCOUNTER — MYC MEDICAL ADVICE (OUTPATIENT)
Dept: FAMILY MEDICINE | Facility: CLINIC | Age: 20
End: 2025-04-05
Payer: COMMERCIAL

## 2025-04-05 NOTE — LETTER
April 8, 2025      Yessy Currie  [address] [facility] [address]        To Whom It May Concern:    Yessy Currie was seen in our clinic on 4/4/25.  Please excuse her from work due to illness.        Sincerely,        JOEY Patton CNP    Electronically signed

## 2025-04-08 ENCOUNTER — TELEPHONE (OUTPATIENT)
Dept: FAMILY MEDICINE | Facility: CLINIC | Age: 20
End: 2025-04-08

## 2025-04-08 NOTE — TELEPHONE ENCOUNTER
Forms/Letter Request    Type of form/letter: OTHER: work       Do we have the form/letter: Yes: Please see my chart message. It was supposed to be written by provider    Who is the form from? Peter Melo     Where did/will the form come from? Peter    When is form/letter needed by: asap. She also states she needs to speak with her care team about a follow up question from last visit. She would not give any other information     How would you like the form/letter returned: [employer]    Patient Notified form requests are processed in 5-7 business days:Yes    Could we send this information to you in [employer] or would you prefer to receive a phone call?:   Patient would like to be contacted via [employer]

## 2025-04-08 NOTE — TELEPHONE ENCOUNTER
Sent letter via [employer].   Discussed concerns with patient over the phone.   - wondering if new psych meds affect her birth control options. Advised they do not but methylphenidate can cause headaches. She will follow up with psych as planned and continue planw e discussed at last visit.

## 2025-04-09 ENCOUNTER — VIRTUAL VISIT (OUTPATIENT)
Dept: FAMILY MEDICINE | Facility: CLINIC | Age: 20
End: 2025-04-09
Payer: COMMERCIAL

## 2025-04-09 DIAGNOSIS — H53.9 VISION CHANGES: ICD-10-CM

## 2025-04-09 DIAGNOSIS — R11.0 NAUSEA: ICD-10-CM

## 2025-04-09 DIAGNOSIS — G43.109 MIGRAINE WITH AURA AND WITHOUT STATUS MIGRAINOSUS, NOT INTRACTABLE: Primary | ICD-10-CM

## 2025-04-09 PROCEDURE — 98005 SYNCH AUDIO-VIDEO EST LOW 20: CPT

## 2025-04-09 RX ORDER — SUMATRIPTAN SUCCINATE 25 MG/1
25 TABLET ORAL
Qty: 8 TABLET | Refills: 0 | Status: SHIPPED | OUTPATIENT
Start: 2025-04-09

## 2025-04-09 RX ORDER — ONDANSETRON 4 MG/1
4 TABLET, ORALLY DISINTEGRATING ORAL EVERY 8 HOURS PRN
Qty: 8 TABLET | Refills: 0 | Status: SHIPPED | OUTPATIENT
Start: 2025-04-09

## 2025-04-09 NOTE — PROGRESS NOTES
"Srinivasan is a 19 year old who is being evaluated via a billable video visit.    How would you like to obtain your AVS? MyChart  If the video visit is dropped, the invitation should be resent by: Text to cell phone: [phone]  Will anyone else be joining your video visit? No      Assessment & Plan     Migraine with aura and without status migrainosus, not intractable  - SUMAtriptan (IMITREX) 25 MG tablet  Dispense: 8 tablet; Refill: 0    Nausea  - ondansetron (ZOFRAN ODT) 4 MG ODT tab  Dispense: 8 tablet; Refill: 0    Vision changes  - Adult Eye  Referral            BMI  Estimated body mass index is 35.94 kg/m  as calculated from the following:    Height as of 4/4/25: 1.753 m (5' 9\").    Weight as of 4/4/25: 110.4 kg (243 lb 6.4 oz).         See Patient Instructions  Keep headache \"diary\" to track symptoms  Try medications for headaches:  Extra strength Tylenol 1000 mg (2 of the 500 MG tabs) (also called acetaminophen) - every 8 hours    Ibuprofen (motrin, advil) 800 mg (4 over the counter tablets) - every 8 hours    Try sumatriptan (aka imitrex)    Schedule with eye doctor.    Follow up in 3 months    Subjective   Srinivasan is a 19 year old, presenting for the following health issues:  RECHECK        4/9/2025    11:12 AM   Additional Questions   Roomed by Ruby Stroud Start Time:  see below    History of Present Illness       Reason for visit:  Pregnacy test She is missing 4 dose(s) of medications per week.  She is not taking prescribed medications regularly due to remembering to take.          Started end of 2023  On/off, infrequent but lasts long time, 2-5 times, last up to 3 days (only 1 time)  Symptoms variable  Front/one side  + Sound is muffled  +nausea, occasional vomiting   + Photophobia    Lays in dark room helps  Sometimes after sleeping goes away                  Review of Systems  Constitutional, HEENT, cardiovascular, pulmonary, gi and gu systems are negative, except as otherwise noted.    "   Objective           Vitals:  No vitals were obtained today due to virtual visit.    Physical Exam   GENERAL: alert and no distress  EYES: Eyes grossly normal to inspection.  No discharge or erythema, or obvious scleral/conjunctival abnormalities.  RESP: No audible wheeze, cough, or visible cyanosis.    SKIN: Visible skin clear. No significant rash, abnormal pigmentation or lesions.  NEURO: Cranial nerves grossly intact.  Mentation and speech appropriate for age.  PSYCH: Appropriate affect, tone, and pace of words    Office Visit on 04/04/2025   Component Date Value Ref Range Status    hCG Urine Qualitative 04/04/2025 Negative  Negative Final    This test is for screening purposes.  Results should be interpreted along with the clinical picture.  Confirmation testing is available if warranted by ordering [id number], HCG Quantitative Pregnancy.    Sodium 04/04/2025 136  135 - 145 mmol/L Final    Potassium 04/04/2025 4.1  3.4 - 5.3 mmol/L Final    Carbon Dioxide (CO2) 04/04/2025 22  22 - 29 mmol/L Final    Anion Gap 04/04/2025 11  7 - 15 mmol/L Final    Urea Nitrogen 04/04/2025 12.9  6.0 - 20.0 mg/dL Final    Creatinine 04/04/2025 0.72  0.51 - 0.95 mg/dL Final    GFR Estimate 04/04/2025 >90  >60 mL/min/1.73m2 Final    eGFR calculated using 2021 CKD-EPI equation.    Calcium 04/04/2025 9.2  8.8 - 10.4 mg/dL Final    Chloride 04/04/2025 103  98 - 107 mmol/L Final    Glucose 04/04/2025 96  70 - 99 mg/dL Final    Alkaline Phosphatase 04/04/2025 68  40 - 150 U/L Final    AST 04/04/2025 23  0 - 35 U/L Final    ALT 04/04/2025 19  0 - 50 U/L Final    Protein Total 04/04/2025 7.6  6.4 - 8.3 g/dL Final    Albumin 04/04/2025 4.5  3.5 - 5.2 g/dL Final    Bilirubin Total 04/04/2025 0.5  <=1.2 mg/dL Final    TSH 04/04/2025 1.96  0.50 - 4.30 uIU/mL Final    WBC Count 04/04/2025 5.2  4.0 - 11.0 10e3/uL Final    RBC Count 04/04/2025 5.12  3.80 - 5.20 10e6/uL Final    Hemoglobin 04/04/2025 13.5  11.7 - 15.7 g/dL Final    Hematocrit  04/04/2025 42.3  35.0 - 47.0 % Final    MCV 04/04/2025 83  78 - 100 fL Final    MCH 04/04/2025 26.4 (L)  26.5 - 33.0 pg Final    MCHC 04/04/2025 31.9  31.5 - 36.5 g/dL Final    RDW 04/04/2025 12.9  10.0 - 15.0 % Final    Platelet Count 04/04/2025 191  150 - 450 10e3/uL Final         Video-Visit Details  Joined the call at 4/9/2025, 11:39:22 am.  Left the call at 4/9/2025, 12:01:15 pm.  You were on the call for 21 minutes 53 seconds  Type of service:  Video Visit   Video End Time: above  Originating Location (pt. Location): Home    Distant Location (provider location):  Off-site  Platform used for Video Visit: Josh  Signed Electronically by: JOEY Patton CNP

## 2025-04-09 NOTE — PATIENT INSTRUCTIONS
"Keep headache \"diary\" to track symptoms  Try medications for headaches:  Extra strength Tylenol 1000 mg (2 of the 500 MG tabs) (also called acetaminophen) - every 8 hours    Ibuprofen (motrin, advil) 800 mg (4 over the counter tablets) - every 8 hours    Try sumatriptan (aka imitrex)    Schedule with eye doctor.    Follow up in 3 months        "

## 2025-04-25 ENCOUNTER — E-VISIT (OUTPATIENT)
Dept: FAMILY MEDICINE | Facility: CLINIC | Age: 20
End: 2025-04-25
Payer: COMMERCIAL

## 2025-04-25 DIAGNOSIS — G43.109 MIGRAINE WITH AURA AND WITHOUT STATUS MIGRAINOSUS, NOT INTRACTABLE: Primary | ICD-10-CM

## 2025-04-25 PROCEDURE — 99421 OL DIG E/M SVC 5-10 MIN: CPT

## 2025-04-25 RX ORDER — KETOROLAC TROMETHAMINE 30 MG/ML
60 INJECTION, SOLUTION INTRAMUSCULAR; INTRAVENOUS ONCE
Status: COMPLETED | OUTPATIENT
Start: 2025-04-25 | End: 2025-04-25

## 2025-04-25 NOTE — TELEPHONE ENCOUNTER
Provider E-Visit time total (minutes): {AMB PROVIDER EVISIT TOTAL MINUTES LIST:410758}    {Please send feedback regarding eVisits to [email or web address]}

## 2025-04-25 NOTE — LETTER
April 25, 2025      Yessy Currie  [address] [facility] [address]        To Whom It May Concern:    Please excuse Yessy Currie  from work on 4/25/25 due to illness.        Sincerely,        JOEY Patton CNP    Electronically signed

## 2025-04-25 NOTE — TELEPHONE ENCOUNTER
Provider E-Visit time total (minutes): {AMB PROVIDER EVISIT TOTAL MINUTES LIST:[id number]}    {Please send feedback regarding eVisits to [email or web address]}

## 2025-04-25 NOTE — TELEPHONE ENCOUNTER
Provider E-Visit time total (minutes): 6 minutes    Please call patient. Ordered toradol injection to be given ASAP in clinic today. .     Called pt and scheduled on MA schedule for injection. Negra DURBIN MA

## 2025-04-25 NOTE — PATIENT INSTRUCTIONS
Thank you for choosing us for your care. I have placed an order for a prescription so that you can start treatment:  Orders Placed This Encounter   Medications     ketorolac (TORADOL) injection 60 mg        View your full visit summary for details by clicking on the link below. Your pharmacist will able to address any questions you may have about the medication.     If you're not feeling better within 5-7 days, please schedule an appointment.  You can schedule an appointment right here in [facility], or call [phone]  If the visit is for the same symptoms as your eVisit, we'll refund the cost of your eVisit if seen within seven days.    Headache: Care Instructions  Overview     Headaches have many possible causes. Most headaches aren't a sign of a more serious problem, and they will get better on their own. Home treatment may help you feel better faster.  The doctor has checked you carefully, but problems can develop later. If you notice any problems or new symptoms, get medical treatment right away.  Follow-up care is a key part of your treatment and safety. Be sure to make and go to all appointments, and call your doctor if you are having problems. It's also a good idea to know your test results and keep a list of the medicines you take.  How can you care for yourself at home?  Rest in a quiet, dark room until your headache is gone. Close your eyes and try to relax or go to sleep. Don't watch TV or read.  Put a cold, moist cloth or cold pack on the painful area for 10 to 20 minutes at a time. Put a thin cloth between the cold pack and your skin.  Use a warm, moist towel or a heating pad set on low to relax tight shoulder and neck muscles.  Have someone gently massage your neck and shoulders.  Take pain medicines exactly as directed.  If the doctor gave you a prescription medicine for pain, take it as prescribed.  If you are not taking a prescription pain medicine, ask your doctor if you can take an  over-the-counter medicine.  Do not ignore new symptoms that occur with a headache, such as a fever, weakness or numbness, vision changes, or confusion. These may be signs of a more serious problem.  To prevent headaches  Keep a headache diary so you can figure out what triggers your headaches. Avoiding triggers may help you prevent headaches. Record when each headache began, how long it lasted, and what the pain was like (throbbing, aching, stabbing, or dull). Write down any other symptoms you had with the headache, such as nausea, flashing lights or dark spots, or sensitivity to bright light or loud noise. Note if the headache occurred near your period. List anything that might have triggered the headache, such as certain foods (chocolate, cheese, wine) or odors, smoke, bright light, stress, or lack of sleep.  Find healthy ways to deal with stress. Headaches are most common during or right after stressful times. Take time to relax before and after you do something that has caused a headache in the past.  Try to keep your muscles relaxed by keeping good posture. Check your jaw, face, neck, and shoulder muscles for tension, and try relaxing them. When sitting at a desk, change positions often, and stretch for 30 seconds each hour.  Get plenty of sleep and exercise.  Eat regularly. Long periods without food can trigger a headache.  Limit caffeine by not drinking too much coffee, tea, or soda. But don't quit caffeine suddenly, because that can also give you headaches.  Reduce eyestrain from computers by blinking frequently and looking away from the computer screen every so often. Make sure you have proper eyewear and that your monitor is set up properly, about an arm's length away.  When should you call for help?   Call 911 anytime you think you may need emergency care. For example, call if:    You have signs of a stroke. These may include:  Sudden numbness, paralysis, or weakness in your face, arm, or leg, especially  "on only one side of your body.  Sudden vision changes.  Sudden trouble speaking.  Sudden confusion or trouble understanding simple statements.  Sudden problems with walking or balance.  A sudden, severe headache that is different from past headaches.   Call your doctor now or seek immediate medical care if:    You have a fever and a stiff neck.     You have new nausea and vomiting, or you cannot keep down food or fluids.     Your headache gets much worse.   Watch closely for changes in your health, and be sure to contact your doctor if:    Your headaches get worse, happen more often, or change in some way.     You have new symptoms.     Your life is disrupted by your headaches. For example, you often miss work, school, or other activities.     You do not get better as expected.   Where can you learn more?  Go to https://www.[employer].net/patiented  Enter M271 in the search box to learn more about \"Headache: Care Instructions.\"  Current as of: December 3, 2024  Content Version: 14.4    7393-3905 [employer].   Care instructions adapted under license by your healthcare professional. If you have questions about a medical condition or this instruction, always ask your healthcare professional. [employer] disclaims any warranty or liability for your use of this information.    "

## 2025-05-07 ENCOUNTER — TELEPHONE (OUTPATIENT)
Dept: FAMILY MEDICINE | Facility: CLINIC | Age: 20
End: 2025-05-07

## 2025-05-07 ENCOUNTER — MYC MEDICAL ADVICE (OUTPATIENT)
Dept: FAMILY MEDICINE | Facility: CLINIC | Age: 20
End: 2025-05-07

## 2025-05-07 ENCOUNTER — VIRTUAL VISIT (OUTPATIENT)
Dept: FAMILY MEDICINE | Facility: CLINIC | Age: 20
End: 2025-05-07
Payer: COMMERCIAL

## 2025-05-07 DIAGNOSIS — G44.219 EPISODIC TENSION-TYPE HEADACHE, NOT INTRACTABLE: Primary | ICD-10-CM

## 2025-05-07 DIAGNOSIS — R11.11 VOMITING WITHOUT NAUSEA, UNSPECIFIED VOMITING TYPE: ICD-10-CM

## 2025-05-07 DIAGNOSIS — R20.2 RIGHT HAND PARESTHESIA: ICD-10-CM

## 2025-05-07 PROCEDURE — 98005 SYNCH AUDIO-VIDEO EST LOW 20: CPT

## 2025-05-07 ASSESSMENT — PATIENT HEALTH QUESTIONNAIRE - PHQ9
10. IF YOU CHECKED OFF ANY PROBLEMS, HOW DIFFICULT HAVE THESE PROBLEMS MADE IT FOR YOU TO DO YOUR WORK, TAKE CARE OF THINGS AT HOME, OR GET ALONG WITH OTHER PEOPLE: NOT DIFFICULT AT ALL
SUM OF ALL RESPONSES TO PHQ QUESTIONS 1-9: 0
SUM OF ALL RESPONSES TO PHQ QUESTIONS 1-9: 0

## 2025-05-07 ASSESSMENT — ENCOUNTER SYMPTOMS: HEADACHES: 1

## 2025-05-07 NOTE — PROGRESS NOTES
"Srinivasan is a 19 year old who is being evaluated via a billable video visit.    How would you like to obtain your AVS? MyChart  If the video visit is dropped, the invitation should be resent by: Text to cell phone: [phone]  Will anyone else be joining your video visit? No      Assessment & Plan     Episodic tension-type headache, not intractable  DDx most likely tension-type headache or migraine but given patient is reporting numbness and weakness in her right extremity and episode of vomiting without nausea, via video visit I am unable to check vital signs or physical exam so I advised her to present to ER. She is agreeable and will have her mom pick her up.     Right hand paresthesia  DDx includes atypical migraine symptom, radicular type neck pain, vs other. She has not had this symptoms with headaches in the past.     Vomiting without nausea, unspecified vomiting type  Unclear etiology. DDx includes migraine sequale vs other.             BMI  Estimated body mass index is 35.94 kg/m  as calculated from the following:    Height as of 4/4/25: 1.753 m (5' 9\").    Weight as of 4/4/25: 110.4 kg (243 lb 6.4 oz).             Subjective   Srinivasan is a 19 year old, presenting for the following health issues:  Headache        5/7/2025    12:29 PM   Additional Questions   Roomed by mian     Video Start Time: see below.    Headache     History of Present Illness       Reason for visit:  Headache, moving around. started in front and moved to back of neck. dull pain in head and sharp in back       She came to clinic for toradol injection with last migraine. That made her headache go away about 30 minutes later.     Her medication is not helping with this headache.     Tried sumatriptan 3 tabs yesterday and no improvement.   Has not taken tylenol or ibuprofen.    Headache started yesterday as dull pain in front of head, progressed to back of head and neck. Mild at first, after waking up after a nap it was more severe.     Now " there is sharp pain going up and down neck.   She is having a hard time standing up due to how bad the headache gets with position changes. She denies feeling dizzy today but did have some yesterday.     Threw up 1 time but no nausea.     Bright lights make it worse.   Loud noises make it worse.     Having numbness/weakness in left hand. Her  strength is weak.  Has never happened with a headache before.     Rates headache 8/10, comes and goes.       Of note pt started methylphenidate in past few months, we have discussed this can cause headaches but pt states she has not taken in a few weeks.     Migraine   Since your last clinic visit, how have your headaches changed?  Worsened  How often are you getting headaches or migraines? Started yesterday, took three imirtex and it did not help  Are you able to do normal daily activities when you have a migraine? No  Are you taking rescue/relief medications? (Select all that apply) sumatriptan (Imitrex)  How helpful is your rescue/relief medication?  I get no relief  Are you taking any medications to prevent migraines? (Select all that apply)  No  In the past 4 weeks, how often have you gone to urgent care or the emergency room because of your headaches?  0          Review of Systems  Constitutional, HEENT, cardiovascular, pulmonary, gi and gu systems are negative, except as otherwise noted.      Objective           Vitals:  No vitals were obtained today due to virtual visit.    Physical Exam   GENERAL: alert and no distress  EYES: Eyes grossly normal to inspection.  No discharge or erythema, or obvious scleral/conjunctival abnormalities.  RESP: No audible wheeze, cough, or visible cyanosis.    SKIN: Visible skin clear. No significant rash, abnormal pigmentation or lesions.  NEURO: Cranial nerves grossly intact.  Mentation and speech appropriate for age.  PSYCH: Appropriate affect, tone, and pace of words          Video-Visit Details  Joined the call at 5/7/2025, 1:38:15  pm.  Left the call at 5/7/2025, 1:52:27 pm.  You were on the call for 14 minutes 11 seconds.  Type of service:  Video Visit   Video End Time:1:52 PM  Originating Location (pt. Location): Home    Distant Location (provider location):  Off-site  Platform used for Video Visit: Josh  Signed Electronically by: JOEY Patton CNP

## 2025-05-07 NOTE — TELEPHONE ENCOUNTER
Patient called regarding migraine.  Had evisit on 4/25/25 for this and Toradol injection was ordered.  She stated that the injection was helpful, however, she developed another migraine yesterday.  She does not have a ride today to seek care.  Took 3 imitrex yesterday, last dose was before going to bed last night.  She does not feel that the imitrex is helping     She set up a video visit with PCP at 1:30 PM today to discuss further treatment, however, she is wondering if she can take 2 tabs (50 mg) of the imitrex now.  Explained that this would be more than prescribed for single dose so will need to ask PCP to advise    Sarah North RN  [facility]

## 2025-05-07 NOTE — LETTER
May 7, 2025      Yessy Currie  [address] [facility] [address]        To Whom It May Concern:    Yessy Currie  was seen on 5/7/25.  Please excuse her from work on 5/6/25-5/8/25 due to illness. She may return after that date without restrictions.        Sincerely,        JOEY Patton CNP    Electronically signed

## 2025-05-13 ENCOUNTER — VIRTUAL VISIT (OUTPATIENT)
Dept: PSYCHIATRY | Facility: CLINIC | Age: 20
End: 2025-05-13
Payer: COMMERCIAL

## 2025-05-13 ENCOUNTER — PATIENT OUTREACH (OUTPATIENT)
Dept: CARE COORDINATION | Facility: CLINIC | Age: 20
End: 2025-05-13
Payer: COMMERCIAL

## 2025-05-13 DIAGNOSIS — G47.9 SLEEP DISTURBANCE: ICD-10-CM

## 2025-05-13 DIAGNOSIS — F90.9 ATTENTION DEFICIT HYPERACTIVITY DISORDER (ADHD), UNSPECIFIED ADHD TYPE: Primary | ICD-10-CM

## 2025-05-13 DIAGNOSIS — F32.A DEPRESSION, UNSPECIFIED DEPRESSION TYPE: ICD-10-CM

## 2025-05-13 PROCEDURE — G2211 COMPLEX E/M VISIT ADD ON: HCPCS | Mod: 95

## 2025-05-13 PROCEDURE — 98006 SYNCH AUDIO-VIDEO EST MOD 30: CPT

## 2025-05-13 ASSESSMENT — ANXIETY QUESTIONNAIRES
6. BECOMING EASILY ANNOYED OR IRRITABLE: NEARLY EVERY DAY
IF YOU CHECKED OFF ANY PROBLEMS ON THIS QUESTIONNAIRE, HOW DIFFICULT HAVE THESE PROBLEMS MADE IT FOR YOU TO DO YOUR WORK, TAKE CARE OF THINGS AT HOME, OR GET ALONG WITH OTHER PEOPLE: VERY DIFFICULT
GAD7 TOTAL SCORE: 13
7. FEELING AFRAID AS IF SOMETHING AWFUL MIGHT HAPPEN: SEVERAL DAYS
GAD7 TOTAL SCORE: 13
4. TROUBLE RELAXING: NEARLY EVERY DAY
8. IF YOU CHECKED OFF ANY PROBLEMS, HOW DIFFICULT HAVE THESE MADE IT FOR YOU TO DO YOUR WORK, TAKE CARE OF THINGS AT HOME, OR GET ALONG WITH OTHER PEOPLE?: VERY DIFFICULT
2. NOT BEING ABLE TO STOP OR CONTROL WORRYING: SEVERAL DAYS
3. WORRYING TOO MUCH ABOUT DIFFERENT THINGS: MORE THAN HALF THE DAYS
5. BEING SO RESTLESS THAT IT IS HARD TO SIT STILL: SEVERAL DAYS
1. FEELING NERVOUS, ANXIOUS, OR ON EDGE: MORE THAN HALF THE DAYS
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
## 2025-05-13 NOTE — PATIENT INSTRUCTIONS
Plan:  -Continue clonidine 0.3mg nightly  -Remain off Concerta    **For crisis resources, please see the information at the end of this document**   Patient Education    Thank you for coming to the [facility] [facility].     Lab Testing:  If you had lab testing today and your results are reassuring or normal they will be mailed to you or sent through BitePal within 7 days. If the lab tests need quick action we will call you with the results. The phone number we will call with results is # [phone]. If this is not the best number please call our clinic and change the number.     Medication Refills:  If you need any refills please call your pharmacy and they will contact us.   Three business days of notice are needed for general medication refill requests.   Five business days of notice are needed for controlled substance refill requests.   If you need to change to a different pharmacy, please contact the new pharmacy directly. The new pharmacy will help you get your medications transferred.     Contact Us:  Please call [phone] during business hours (8-5:00 M-F).   Financial Assistance [phone]   Medical Records [phone]       MENTAL HEALTH CRISIS RESOURCES:  For a emergency help, please call 911 or go to the nearest Emergency Department.     Emergency Walk-In Options:   EmPATH Unit @ [facility] ([place]): [phone] - Specialized mental health emergency area designed to be calming  [facility] ([place]): [phone]  [facility] ([place]): [phone]  [facility]): [phone]    County Crisis Information:   [place]: [phone]  [place]: [phone]  [place] ([place]) - Adult: [phone]     Child: [phone]  [place] - Adult: [phone]     Child: [phone]  [place]: [phone]  List of all [facility] resources:    https://[place].gov/dhs/people-we-serve/adults/health-care/mental-health/resources/crisis-contacts.jsp    National Crisis Information:   Crisis Text Line: Text  [place]  to [address]  Suicide & Crisis Lifeline: 988  National Suicide Prevention Lifeline: 9-613-700-TALK (1-934.677.2783)       For online chat options, visit https://suicidepreventionlifeline.org/chat/  Poison Control Center: 3-854-969-7031  Trans Lifeline: 9-262-264-7642 - Hotline for transgender people of all ages  The Toni Project: 7-073-605-6861 - Hotline for LGBT youth     For Non-Emergency Support:   Fast Tracker: Mental Health & Substance Use Disorder Resources -   https://www.MailInBlackckWalkMen.org/

## 2025-05-22 ENCOUNTER — VIRTUAL VISIT (OUTPATIENT)
Dept: FAMILY MEDICINE | Facility: CLINIC | Age: 20
End: 2025-05-22
Payer: COMMERCIAL

## 2025-05-22 DIAGNOSIS — Z56.9: ICD-10-CM

## 2025-05-22 DIAGNOSIS — M25.511 ACUTE PAIN OF RIGHT SHOULDER: ICD-10-CM

## 2025-05-22 DIAGNOSIS — G89.29 CHRONIC RIGHT-SIDED LOW BACK PAIN WITH RIGHT-SIDED SCIATICA: Primary | ICD-10-CM

## 2025-05-22 DIAGNOSIS — M54.41 CHRONIC RIGHT-SIDED LOW BACK PAIN WITH RIGHT-SIDED SCIATICA: Primary | ICD-10-CM

## 2025-05-22 RX ORDER — METHOCARBAMOL 500 MG/1
1000 TABLET, FILM COATED ORAL
Qty: 30 TABLET | Refills: 0 | Status: SHIPPED | OUTPATIENT
Start: 2025-05-22

## 2025-05-22 SDOH — ECONOMIC STABILITY - INCOME SECURITY: UNSPECIFIED PROBLEMS RELATED TO EMPLOYMENT: Z56.9

## 2025-05-22 NOTE — LETTER
May 22, 2025      Yessy Currie  [address] [place] [facility] [address]        To Whom It May Concern:      Yessy Currie was seen in our clinic on 5/22/2025. She may return to work with the following restrictions: limited to light duty - lifting no greater than 10 pounds, no use of arms over shoulders, and no bending/stooping, must be able to take a break for 5 mins every 1-2 hrs to rest and stretch as needed, and special equipment needed: back brace for lumbar support. The restrictions should remain in place from 5/22/2025 until 8/22/2025.      Sincerely,      Broderick Bhatia MD          Electronically signed

## 2025-05-22 NOTE — LETTER
2025    Yessy Curire   2005        To Whom it May Concern;    Please excuse Yessy Currie from work for a healthcare visit on May 22, 2025.            Sincerely,        Broderick Bhatia MD      Electronically signed

## 2025-05-22 NOTE — PATIENT INSTRUCTIONS
Srinivasan,    Thank you for allowing [facility] to manage your care today.    Our plan is as follows:    I sent your prescriptions to your pharmacy.  Start methocarbamol (ROBAXIN) 500 MG tablet; Take 2 tablets (1,000 mg) by mouth nightly as needed for muscle spasms.    I ordered MRI imaging, please call diagnostic imaging [phone] to schedule your appointment.    I made a referral to sports medicine and social work, they should be calling in a few days to a week to set up your appointment.   If you do not hear from them, please call the specialty number on your after visit summary.       If you have any questions or concerns, please use [employer]ropper and/or feel free to call us at [phone].    Your partner in health,    Dr. Bhatia

## 2025-05-22 NOTE — PROGRESS NOTES
Srinivasan is a 19 year old who is being evaluated via a billable video visit.    How would you like to obtain your AVS? MyChart  If the video visit is dropped, the invitation should be resent by: Text to cell phone: [phone]  Will anyone else be joining your video visit? Sue      Jarrettcristinabrice Currie is a 19 year old female here for pain concerns.    Assessment & Plan     Chronic right-sided low back pain with right-sided sciatica  Worsening since onset 6 months ago after starting labor intensive job in [employer] working long hours.   Patient tried physical therapy but did not have enough time in her schedule to complete treatment.   Recommend the following:  Given worsening sciatica pain and impact on daily function, will proceed with lumbar MRI  Pending MRI results, likely refer to spine clinic for evaluation and optimization   Trial muscle relaxer to help with pain at night and sleep  Activity modification, work note provided  Discussed job change if possible to prevent working of back issues and long term health   After shared decision making, patient agreeable with plan   - methocarbamol (ROBAXIN) 500 MG tablet; Take 2 tablets (1,000 mg) by mouth nightly as needed for muscle spasms.  - MR Lumbar Spine w/o Contrast; Future    Acute pain of right shoulder  Started after ground level fall at work onto right shoulder. Patient did not report incident to employer.   Based on history alone due to virtual interface, suspect rotator cuff injury.   Recommend the following:  Referral to sports medicine for evaluation   NSAIDs and Tylenol PRN for pain control   Activity modification, work note provided  After shared decision making, patient agreeable with plan   - Orthopedic  Referral; Future    Dissatisfaction with employment  Plan as above. Patient interested in meeting with social work for any employment resources.   - Primary Care - Care Coordination Referral; Future    AVS provided to patient via  [place].    Follow-up: Return for follow-up per plan.    The longitudinal plan of care for the diagnosis(es)/condition(s) as documented were addressed during this visit. Due to the added complexity in care, I will continue to support Srinivasan in the subsequent management and with ongoing continuity of care.     Broderick Bhatia MD 5/22/2025 4:33 PM    Note to patient: The 21st Century Cures Act makes medical notes like this available to patients in the interest of transparency. However, be advised this is a medical document. It is intended as ogoz-sd-nalw communication. It is written in medical language and may contain abbreviations or verbiage that are unfamiliar. It may appear blunt or direct. Medical documents are intended to carry relevant information, facts as evident, and the clinical opinion of the practitioner.          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, long hours with physical labor   Low back pain is getting worse, hard to bend over and pick things up  Feels okay when sitting still  Worse when lifting something up, sharp shooting pain down right leg  Able to sleep okay  Taking NSAIDs, Tylenol which is helping some    Right Shoulder pain  2 months ago, fell at work, patient states this will not be a work comp claim  Hurts worse with lifting arm up and extending arm to pick something up   Even worse when lifting up heavy things with extended arm  Worse with cold weather    Working in  [employer] 40 hours weekly   Recently working more due to short staffing  Considering job change, interested in nursing    Review of Systems: Please refer to HPI for pertinent positives and negatives.            Objective       Vitals:  No vitals were obtained today due to virtual visit.    Physical Exam   GENERAL: alert and no distress  EYES: Eyes grossly normal to inspection.  No discharge or erythema, or obvious scleral/conjunctival abnormalities.  RESP: No audible wheeze, cough, or visible cyanosis.    SKIN: Visible skin clear. No significant rash, abnormal pigmentation or lesions.  NEURO: Cranial nerves grossly intact.  Mentation and speech appropriate for age.  PSYCH: Appropriate affect, tone, and pace of words        Video-Visit Details  Type of service:  Video Visit   Video End Time:4:58 PM  Originating Location (pt. Location): Home    Distant Location (provider location):  On-site  Platform used for Video Visit: oJsh  Signed Electronically by: Broderick Bhatia MD

## 2025-05-26 ENCOUNTER — PATIENT OUTREACH (OUTPATIENT)
Dept: CARE COORDINATION | Facility: CLINIC | Age: 20
End: 2025-05-26
Payer: COMMERCIAL

## 2025-05-27 ENCOUNTER — PATIENT OUTREACH (OUTPATIENT)
Dept: NURSING | Facility: CLINIC | Age: 20
End: 2025-05-27
Payer: COMMERCIAL

## 2025-05-27 NOTE — LETTER
[facility] CARE COORDINATION  [address] [facility] [address]     May 28, 2025    Yessy Currie  [address]  [facility] [facility] [address]      Dear Srinivasan,    I am a clinic care coordinator who works with JOEY Patton CNP with the [facility]. I wanted to thank you for spending the time to talk with me.  Below is a description of clinic care coordination and how I can further assist you.       The clinic care coordination team is made up of a registered nurse, , financial resource worker and community health worker who understand the health care system. The goal of clinic care coordination is to help you manage your health and improve access to the health care system. Our team works alongside your provider to assist you in determining your health and social needs. We can help you obtain health care and community resources, providing you with necessary information and education. We can work with you through any barriers and develop a care plan that helps coordinate and strengthen the communication between you and your care team.  Our services are voluntary and are offered without charge to you personally.    Please feel free to contact me with any questions or concerns regarding care coordination and what we can offer.      We are focused on providing you with the highest-quality healthcare experience possible.    Sincerely,     Jane Raman, DEONTE, MSW Clinic   [facility]  Care Coordination  [facility]   [email or web address]  Office: [phone]  Employed by [employer]      Additional Information: I have enclosed a copy of a 24 Hour Access Plan. This has helpful phone numbers for you to call when needed. Please keep this in an easy to access place to use as needed.

## 2025-05-27 NOTE — LETTER
[facility]  Patient Centered Plan of Care  About Me:        Patient Name:  Srinivasan Currie    YOB: 2005  Age:         19 year old   [place] MRN:    [id number] Telephone Information:  Home Phone [phone]   Mobile [phone]       Address:  [address] [facility] [address] Email address:  [email or web address]      Emergency Contact(s)    Name Relationship Lgl Grd Work Phone Home Phone Mobile Phone   1. JOEL BENITEZ* Mother  [phone]   2. SHARMAINE CURRIE Father   [phone]           Primary language:  English     needed? No   [facility]:  [phone] op. 1  Other communication barriers:None    Preferred Method of Communication:  Phone, MyChart  Current living arrangement: I live alone    Mobility Status/ Medical Equipment: Independent        Health Maintenance  Health Maintenance Reviewed: Due/Overdue   Health Maintenance Due   Topic Date Due    COVID-19 VACCINE (1 - 2024-25 season) Never done          My Access Plan  Medical Emergency 911   Primary Clinic Line [facility] - [phone]   24 Hour Appointment Line [phone] or  [phone]) (toll-free)   24 Hour Nurse Line [phone] (toll-free)   Preferred Urgent Care [facility] [phone]     [facility]  [phone]     Preferred Pharmacy [facility] [place], [place] - [address] [place] [address].     Behavioral Health Crisis Line The National Suicide Prevention Lifeline at 1-285.476.5175 or Text/Call 358           My Care Team Members  Patient Care Team         Relationship Specialty Notifications Start End    Peter Melo APRN CNP PCP - General Family Medicine  3/18/24     Phone: [phone] Fax: [phone]         [facility] [facility] [address]    Catina Forrester MD MD OB/Gyn  1/23/24     Phone: [phone] Fax:  [phone]         [address] [place] [address] [address] [facility] [address]    Rome Pillai PA Physician Assistant Otolaryngology  5/13/24     Referred to allergy    Phone: [phone] Fax: [phone]         [address] [facility] [address] [facility] [address]    Kevin Harrison MD MD Allergy & Immunology  6/3/24     Phone: [phone] Fax: [phone] [address] [facility] [address]    Peter Melo APRN CNP Assigned PCP   9/23/24     Phone: [phone] Fax: [phone]         [address] [place] [facility] [address]    Alesah Hernández APRN CNP Assigned Behavioral Health Provider   12/23/24     Phone: [phone] Fax: [phone] [address] [facility] [address] [facility] [address] [address]    Rome Pillai PA Assigned Surgical Provider   3/23/25     Phone: [phone] Fax: [phone]         [address] [place] [address] [address] [facility] [address]    Jane Raman BSW Lead Care Coordinator Primary Care - [facility] Admissions 5/23/25     Phone: [phone] Fax: [phone]        Sirena Lozano CHW Community Health Worker Primary Care - [facility] Admissions 5/28/25     Phone: [phone] Fax: [phone]                    My Care Plans  Self Management and Treatment Plan    Care Plan  Care Plan: Mental Health       Problem: Mental Health Symptoms Need Improvement       Goal: Improve management of mental health symptoms and establish with mental health/psychosocial supports       Start Date: 5/27/2025 Expected End Date: 9/4/2025    This Visit's Progress: 20%    Priority: High    Note:     Barriers: physical limitations for current employment, desire for a career change, chronic pain  Strengths: initial visit with therapy 5/13/2025  Patient expressed understanding of goal: yes  Action steps to achieve this goal:  1. I will continue to attend therapy appts   2. I will take medications as prescribed   3. I will request further support resources as needs arise                             Care  Plan: Employment       Problem: Needs alternate employment       Goal: I will research resources at [employer]       Start Date: 5/27/2025 Expected End Date: 9/4/2025    This Visit's Progress: 0%    Priority: Medium    Note:     Barriers: current employment physically very difficult   Strengths: motivated to research options   Patient expressed understanding of goal: yes  Action steps to achieve this goal:  1. I will find time to call [employer]   2. I will discuss obtaining an appt to discuss my current needs with employment   3. I will attend classes they offer regarding employment needs                               Action Plans on File: none                      Advance Care Plans/Directives:   Advanced Care Plan/Directives on file: No    Discussed with patient/caregiver(s): Declined Further Information             My Medical and Care Information  Problem List   Patient Active Problem List   Diagnosis    Keloid scar    Parent-child conflict    ASCUS with positive high risk HPV cervical    Moderate episode of recurrent major depressive disorder (H)      Current Medications:  Please refer to the most recent medication list provided to you by your medical team and reach out to your provider with any questions or to make any corrections.    Care Coordination Start Date: 5/22/2025   Frequency of Care Coordination: monthly, more frequently as needed     Form Last Updated: 05/28/2025

## 2025-05-28 ASSESSMENT — SOCIAL DETERMINANTS OF HEALTH (SDOH)
HOW OFTEN DO YOU ATTENT MEETINGS OF THE CLUB OR ORGANIZATION YOU BELONG TO?: PATIENT DECLINED
HOW OFTEN DO YOU GET TOGETHER WITH FRIENDS OR RELATIVES?: PATIENT DECLINED
ARE YOU MARRIED, WIDOWED, DIVORCED, SEPARATED, NEVER MARRIED, OR LIVING WITH A PARTNER?: PATIENT DECLINED
IN A TYPICAL WEEK, HOW MANY TIMES DO YOU TALK ON THE PHONE WITH FAMILY, FRIENDS, OR NEIGHBORS?: ONCE A WEEK
HOW OFTEN DO YOU ATTEND CHURCH OR RELIGIOUS SERVICES?: PATIENT DECLINED
DO YOU BELONG TO ANY CLUBS OR ORGANIZATIONS SUCH AS CHURCH GROUPS UNIONS, FRATERNAL OR ATHLETIC GROUPS, OR SCHOOL GROUPS?: PATIENT DECLINED

## 2025-05-28 ASSESSMENT — LIFESTYLE VARIABLES
HOW MANY STANDARD DRINKS CONTAINING ALCOHOL DO YOU HAVE ON A TYPICAL DAY: PATIENT DOES NOT DRINK
SKIP TO QUESTIONS 9-10: 1
AUDIT-C TOTAL SCORE: 1
HOW OFTEN DO YOU HAVE A DRINK CONTAINING ALCOHOL: MONTHLY OR LESS
HOW OFTEN DO YOU HAVE SIX OR MORE DRINKS ON ONE OCCASION: NEVER

## 2025-05-28 ASSESSMENT — ACTIVITIES OF DAILY LIVING (ADL): DEPENDENT_IADLS:: TRANSPORTATION

## 2025-05-28 NOTE — PROGRESS NOTES
Clinic Care Coordination Contact  Clinic Care Coordination Contact  OUTREACH    Referral Information: JERRI initial phone assessment     Referral Source: PCP    Primary Diagnosis: Psychosocial    Chief Complaint   Patient presents with    Clinic Care Coordination - Initial     SW        Universal Utilization: canceled appts and no shows   Clinic Utilization  Difficulty keeping appointments:: Yes  Compliance Concerns: Yes  No-Show Concerns: Yes  No PCP office visit in Past Year: No  Utilization      No Show Count (past year)  7             ED Visits  0             Hospital Admissions  0                    Current as of: 5/28/2025 11:52 AM                Clinical Concerns: Patient reports current employment is physical, very difficulty on her existing back pain. She works in a warehouse, she has current restrictions for employment.  Patient is interested in finding employment that is less labor intensive.  SW discussed and provided information on [email or web address] for assistance with job search, updating her resume and related employment search needs:    [email or web address]  [address]  [address]  [place]    Patient will call and inquire, research web site on what is offered for employment assistance.    Patient expressed no other current needs.    Current Medical Concerns:   Patient Active Problem List   Diagnosis    Keloid scar    Parent-child conflict    ASCUS with positive high risk HPV cervical    Moderate episode of recurrent major depressive disorder (H)      Current Behavioral Concerns: depression    Education Provided to patient: [employer], will assist with resumes, has specialized classes for job research and skills  Pain  Pain (GOAL):: Yes  Type: Chronic (>3mo)  Location of chronic pain:: migraine, chronic R sided low back pain with R sided sciatica  Radiating: Yes  Location pain radiates to: throughout her back and legs  Progression: Constant  Description of pain: Aching,  Shooting  Chronic pain severity:: 6  Limitation of routine activities due to chronic pain:: Yes  Description: Able to do most things most days with some rest  Alleviating Factors: Rest  Aggravating Factors: Activity (bending)  Health Maintenance Reviewed: Due/Overdue   Health Maintenance Due   Topic Date Due    COVID-19 VACCINE (1 - 2024-25 season) Never done      Clinical Pathway: None    Medication Management:  Medication review status: Medications reviewed and no changes reported per patient.        Patient does not consistently take medications      Functional Status:  Dependent ADLs:: Independent  Dependent IADLs:: Transportation (she does not have a vehicle)  Bed or wheelchair confined:: No  Mobility Status: Independent  Fallen 2 or more times in the past year?: No  Any fall with injury in the past year?: No    Living Situation:  Current living arrangement:: I live alone  Type of residence:: Apartment    Lifestyle & Psychosocial Needs:    Social Drivers of Health     Food Insecurity: Low Risk  (5/28/2025)    Food Insecurity     Within the past 12 months, did you worry that your food would run out before you got money to buy more?: No     Within the past 12 months, did the food you bought just not last and you didn t have money to get more?: No   Depression: Not at risk (5/7/2025)    PHQ-2     PHQ-2 Score: 0   Recent Concern: Depression - At risk (3/26/2025)    PHQ-2     PHQ-2 Score: 4   Housing Stability: Low Risk  (5/28/2025)    Housing Stability     Do you have housing? : Yes     Are you worried about losing your housing?: No   Tobacco Use: High Risk (5/28/2025)    Patient History     Smoking Tobacco Use: Never     Smokeless Tobacco Use: Current     Passive Exposure: Yes   Financial Resource Strain: Low Risk  (12/12/2024)    Financial Resource Strain     Within the past 12 months, have you or your family members you live with been unable to get utilities (heat, electricity) when it was really needed?: No    Alcohol Use: Not At Risk (5/28/2025)    AUDIT-C     Frequency of Alcohol Consumption: Monthly or less     Average Number of Drinks: Patient does not drink     Frequency of Binge Drinking: Never   Transportation Needs: High Risk (5/28/2025)    Transportation Needs     Within the past 12 months, has lack of transportation kept you from medical appointments, getting your medicines, non-medical meetings or appointments, work, or from getting things that you need?: Yes   Physical Activity: Sufficiently Active (12/12/2024)    Exercise Vital Sign     Days of Exercise per Week: 3 days     Minutes of Exercise per Session: 120 min   Interpersonal Safety: Not At Risk (5/8/2025)    Received from [facility]     Humiliation, Afraid, Rape, and Kick questionnaire     Fear of Current or Ex-Partner: No     Emotionally Abused: No     Physically Abused: No     Sexually Abused: No   Stress: Stress Concern Present (5/28/2025)    [employer] [place] [employer] - Occupational Stress Questionnaire     Feeling of Stress : To some extent   Social Connections: Unknown (5/28/2025)    Social Connection and Isolation Panel [NHANES]     Frequency of Communication with Friends and Family: Once a week     Frequency of Social Gatherings with Friends and Family: Patient declined     Attends Confucianist Services: Patient declined     Active Member of Clubs or Organizations: Patient declined     Attends Club or Organization Meetings: Patient declined     Marital Status: Patient declined   Health Literacy: Adequate Health Literacy (5/28/2025)     Health Literacy     Frequency of need for help with medical instructions: Rarely     Diet:: Regular  Inadequate nutrition (GOAL):: No  Tube Feeding: No  Inadequate activity/exercise (GOAL):: No  Significant changes in sleep pattern (GOAL): Yes (inconsistent sleep pattern)  Transportation means:: Other (bus or would walk)     Confucianist or spiritual beliefs that impact treatment::  No  Mental health DX:: Yes  Mental health DX how managed:: Medication  Mental health management concern (GOAL):: Yes  Chemical Dependency Status: No Current Concerns  Chemical Dependency Management: Other (see comment) (N/A)  Informal Support system:: Family     Resources and Interventions: discussed and provided information on [facility]   Current Resources: psychiatry      Community Resources: None  Supplies Currently Used at Home: None  Equipment Currently Used at Home: none  Employment Status: employed full-time     Advance Care Plan/Directive  Advanced Care Plans/Directives on file:: No  Discussed with patient/caregiver:: Declined Further Information    Referrals Placed: Other  ([facility])     Care Plan:  Care Plan: Mental Health       Problem: Mental Health Symptoms Need Improvement       Goal: Improve management of mental health symptoms and establish with mental health/psychosocial supports       Start Date: 5/27/2025 Expected End Date: 9/4/2025    This Visit's Progress: 20%    Priority: High    Note:     Barriers: physical limitations for current employment, desire for a career change, chronic pain  Strengths: initial visit with therapy 5/13/2025  Patient expressed understanding of goal: yes  Action steps to achieve this goal:  1. I will continue to attend therapy appts   2. I will take medications as prescribed   3. I will request further support resources as needs arise                             Care Plan: Employment       Problem: Needs alternate employment       Goal: I will research options/resources at [facility]       Start Date: 5/27/2025 Expected End Date: 9/4/2025    This Visit's Progress: 0%    Priority: Medium    Note:     Barriers: current employment physically very difficult   Strengths: motivated to research options   Patient expressed understanding of goal: yes  Action steps to achieve this goal:  1. I will find time to call [facility]   2. I will  discuss obtaining an appt to discuss my current needs with employment   3. I will attend classes they offer regarding employment needs                               Patient/Caregiver understanding: Patient will research [facility] for employment search needs.  She will take medications as prescribed and attend all appts as scheduled    Outreach Frequency: monthly, more frequently as needed  Future Appointments                In 2 months Alesha Hernández APRN CNP [facility]    In 6 months Peter Melo APRN CNP [employer] [place]            Plan:    1) Patient will research [facility] for employment search needs  2) Patient will take medications as prescribed and attend all appts as scheduled  3)  will send introduction letter and Complex care plan   4) CHW will call patient in 1 month for update     DEONTE Garrett, MSW   [facility]  Care Coordination  [facility]  [phone]  5/28/2025 3:38 PM

## 2025-06-11 ENCOUNTER — VIRTUAL VISIT (OUTPATIENT)
Dept: FAMILY MEDICINE | Facility: CLINIC | Age: 20
End: 2025-06-11
Payer: COMMERCIAL

## 2025-06-11 DIAGNOSIS — R30.0 DYSURIA: Primary | ICD-10-CM

## 2025-06-11 PROCEDURE — 98013 SYNCH AUDIO-ONLY EST LOW 20: CPT

## 2025-06-11 NOTE — PROGRESS NOTES
"Srinivasan is a 20 year old who is being evaluated via a billable video visit.    What phone number would you like to be contacted at? [phone]  How would you like to obtain your AVS? MyChart      Assessment & Plan     Dysuria  Patient will come in for labs this week and will treat accordingly.   Recent travel with time spent in ocean/long duration in swimwear.  - Chlamydia trachomatis/Neisseria gonorrhoeae by PCR (vagina)  - UA with Microscopic reflex to Culture - lab collect  - Wet prep - Clinic Collect            BMI  Estimated body mass index is 35.94 kg/m  as calculated from the following:    Height as of 4/4/25: 1.753 m (5' 9\").    Weight as of 4/4/25: 110.4 kg (243 lb 6.4 oz).         Follow-up       Subjective   Srinivasan is a 20 year old, presenting for the following health issues:  possible uti        5/22/2025     4:23 PM   Additional Questions   Roomed by Hailey Hill CMA   Accompanied by None       Video Start Time: 1:30 PM    History of Present Illness       Reason for visit:  Possible uti    She eats 2-3 servings of fruits and vegetables daily.She consumes 1 sweetened beverage(s) daily.She exercises with enough effort to increase her heart rate 30 to 60 minutes per day.  She exercises with enough effort to increase her heart rate 3 or less days per week. She is missing 4 dose(s) of medications per week.  She is not taking prescribed medications regularly due to remembering to take.        Genitourinary - Female  Onset/Duration: 6/8/25  Description:   Painful urination (Dysuria): YES           Frequency: No  Blood in urine (Hematuria): No  Delay in urine (Hesitency): No  Intensity: mild, 6/10  Progression of Symptoms:  worsening and constant  Accompanying Signs & Symptoms:  Fever/chills: No  Flank pain: No  Nausea and vomiting: No  Vaginal symptoms: discharge and itching  Abdominal/Pelvic Pain: No  History:   History of frequent UTI s: No  History of kidney stones: No  Sexually Active: YES  Possibility of " pregnancy: Yes  Precipitating or alleviating factors: None  Therapies tried and outcome: OTC advil or tylenol         Review of Systems  Constitutional, HEENT, cardiovascular, pulmonary, gi and gu systems are negative, except as otherwise noted.      Objective           Vitals:  No vitals were obtained today due to virtual visit.    Physical Exam   GENERAL: alert and no distress  EYES: Eyes grossly normal to inspection.  No discharge or erythema, or obvious scleral/conjunctival abnormalities.  RESP: No audible wheeze, cough, or visible cyanosis.    SKIN: Visible skin clear. No significant rash, abnormal pigmentation or lesions.  NEURO: Cranial nerves grossly intact.  Mentation and speech appropriate for age.  PSYCH: Appropriate affect, tone, and pace of words    No results found for this or any previous visit (from the past 24 hours).      Video-Visit Details    Type of service:  Video Visit   Video End Time:1:45 PM  Originating Location (pt. Location): Home    Distant Location (provider location):  Off-site  Platform used for Video Visit: Josh  Signed Electronically by: JOEY Patton CNP

## 2025-06-13 ENCOUNTER — LAB (OUTPATIENT)
Dept: LAB | Facility: CLINIC | Age: 20
End: 2025-06-13
Payer: COMMERCIAL

## 2025-06-13 DIAGNOSIS — R30.0 DYSURIA: ICD-10-CM

## 2025-06-13 LAB
ALBUMIN UR-MCNC: NEGATIVE MG/DL
APPEARANCE UR: CLEAR
BACTERIA #/AREA URNS HPF: ABNORMAL /HPF
BILIRUB UR QL STRIP: NEGATIVE
COLOR UR AUTO: YELLOW
GLUCOSE UR STRIP-MCNC: NEGATIVE MG/DL
HGB UR QL STRIP: NEGATIVE
KETONES UR STRIP-MCNC: NEGATIVE MG/DL
LEUKOCYTE ESTERASE UR QL STRIP: NEGATIVE
NITRATE UR QL: NEGATIVE
PH UR STRIP: 7 [PH] (ref 5–7)
RBC #/AREA URNS AUTO: ABNORMAL /HPF
SP GR UR STRIP: 1.02 (ref 1–1.03)
SQUAMOUS #/AREA URNS AUTO: ABNORMAL /LPF
UROBILINOGEN UR STRIP-ACNC: 1 E.U./DL
WBC #/AREA URNS AUTO: ABNORMAL /HPF

## 2025-06-13 PROCEDURE — 87491 CHLMYD TRACH DNA AMP PROBE: CPT

## 2025-06-13 PROCEDURE — 81001 URINALYSIS AUTO W/SCOPE: CPT

## 2025-06-13 PROCEDURE — 87591 N.GONORRHOEAE DNA AMP PROB: CPT

## 2025-06-14 LAB
C TRACH DNA SPEC QL PROBE+SIG AMP: NEGATIVE
N GONORRHOEA DNA SPEC QL NAA+PROBE: NEGATIVE
SPECIMEN TYPE: NORMAL

## 2025-06-16 ENCOUNTER — RESULTS FOLLOW-UP (OUTPATIENT)
Dept: FAMILY MEDICINE | Facility: CLINIC | Age: 20
End: 2025-06-16

## 2025-06-16 DIAGNOSIS — N76.0 BACTERIAL VAGINITIS: Primary | ICD-10-CM

## 2025-06-16 DIAGNOSIS — B96.89 BACTERIAL VAGINITIS: Primary | ICD-10-CM

## 2025-06-18 RX ORDER — METRONIDAZOLE 7.5 MG/G
1 GEL VAGINAL DAILY
Qty: 35 G | Refills: 0 | Status: SHIPPED | OUTPATIENT
Start: 2025-06-18 | End: 2025-06-25

## 2025-06-25 ENCOUNTER — PATIENT OUTREACH (OUTPATIENT)
Dept: CARE COORDINATION | Facility: CLINIC | Age: 20
End: 2025-06-25
Payer: COMMERCIAL

## 2025-06-25 NOTE — PROGRESS NOTES
Clinic Care Coordination Contact  Community Health Worker Follow Up    Care Gaps:     Health Maintenance Due   Topic Date Due    COVID-19 VACCINE (1 - 2024-25 season) Never done       Patient declined    Care Plan:   Care Plan: Mental Health       Problem: Mental Health Symptoms Need Improvement       Goal: Improve management of mental health symptoms and establish with mental health/psychosocial supports       Start Date: 5/27/2025 Expected End Date: 9/4/2025    This Visit's Progress: 40% Recent Progress: 20%    Priority: High    Note:     Barriers: physical limitations for current employment, desire for a career change, chronic pain  Strengths: initial visit with therapy 5/13/2025  Patient expressed understanding of goal: yes  Action steps to achieve this goal:  1. I continue to attend therapy appts   2. I will take medications as prescribed   3. I will request further support resources as needs arise                             Care Plan: Employment       Problem: Needs alternate employment       Goal: I will research resources at [employer]       Start Date: 5/27/2025 Expected End Date: 9/4/2025    This Visit's Progress: 30% Recent Progress: 0%    Priority: Medium    Note:     Barriers: current employment physically very difficult   Strengths: motivated to research options   Patient expressed understanding of goal: yes  Action steps to achieve this goal:  1. I will find time to call [employer] to discuss options that are virtual.  2. I will discuss obtaining an appt to discuss my current needs with employment   3. I will attend classes they offer regarding employment needs     Currently employed at [employer].                                Intervention and Education during outreach:   Patient states that she hasn't researched the [employer] yet. She is going to call them this week and discuss options that don't involve her having to go to a location. She doesn't currently have  transportation. She is currently walking or taking an Uber to work.   She is working full-time at [employer]. She is working 12 hour shifts usually 5 days a week. She is looking for employment that will allow her to have more free time and flexibility to explore possibly going back to school.     CHW Plan: CHW will continue to support patient with goals through routine scheduled outreach.     Next outreach due: 7/24/25

## 2025-06-25 NOTE — PROGRESS NOTES
Clinic Care Coordination Contact  Care Coordination Clinician Chart Review    Situation: Patient chart reviewed by Care Coordinator.       Background: Care Coordination Program started: 5/22/2025. Initial assessment completed and patient-centered care plan(s) were developed with participation from patient. Lead CC handed patient off to CHW for continued outreaches.       Assessment: Per chart review, patient outreach completed by CC CHW on 6/25/2025.  Per CHW note, patient is declining Covid vaccine.  SW will send to triage care team to request this be removed from Health Maintenance.    Patient stated not yet having researched the [employer].  She is going to call them this week and discuss options that don't involve her having to go to a location. She doesn't currently have transportation. She reported she is currently walking or taking an Uber to work.   She reported she is working full-time at [employer]. She is working 12 hour shifts usually 5 days a week. She reported she is looking for employment allowing her flexibility to explore possibly going back to school.     Patient is actively working to accomplish goal(s). Patient's goal(s) appropriate and relevant at this time. Patient is not due for updated Plan of Care.  Assessments will be completed annually or as needed/with change of patient status.      Care Plan: Mental Health       Problem: Mental Health Symptoms Need Improvement       Goal: Improve management of mental health symptoms and establish with mental health/psychosocial supports       Start Date: 5/27/2025 Expected End Date: 10/1/2025    This Visit's Progress: 50% Recent Progress: 40%    Priority: High    Note:     Barriers: physical limitations for current employment, desire for a career change, chronic pain  Strengths: initial visit with therapy 5/13/2025  Patient expressed understanding of goal: yes  Action steps to achieve this goal:  1. I continue to attend therapy appts   2. I  will take medications as prescribed   3. I will request further support resources as needs arise                             Care Plan: Employment       Problem: Needs alternate employment       Goal: I will research resources at [employer]       Start Date: 5/27/2025 Expected End Date: 10/1/2025    Recent Progress: 30%    Priority: Medium    Note:     Barriers: current employment physically very difficult   Strengths: motivated to research options   Patient expressed understanding of goal: yes  Action steps to achieve this goal:  1. I will find time to call [facility] [employer] [place] to discuss options that are virtual.  2. I will discuss obtaining an appt to discuss my current needs with employment   3. I will attend classes they offer regarding employment needs     Currently employed at [employer].                                   Plan/Recommendations: The patient will continue working with Care Coordination to achieve goal(s) as above. CHW will continue outreaches at minimum every 30 days and will involve Lead CC as needed or if patient is ready to move to Maintenance. Lead CC will continue to monitor CHW outreaches and patient's progress to goal(s) every 6 weeks.     Plan of Care updated and sent to patient: No    DEONTE Garrett, MSW   [facility]  Care Coordination  [facility]  [phone]  6/25/2025 12:15 PM

## 2025-07-01 ENCOUNTER — DOCUMENTATION ONLY (OUTPATIENT)
Dept: FAMILY MEDICINE | Facility: CLINIC | Age: 20
End: 2025-07-01

## 2025-07-01 NOTE — PROGRESS NOTES
Yessy Currie has an upcoming lab appointment: Patient notes state itchy throat thinks might be strep Please place lab order in epic     Thank you    [facility] lab team  [phone]     Future Appointments   Date Time Provider Department Center   7/1/2025  2:30 PM FK LAB [facility]   7/18/2025  8:50 AM BEMR1 [facility]   7/29/2025  9:30 AM Alesha Hernández, APRN CNP [facility]   12/12/2025  9:30 AM Peter Melo APRN CNP [facility]

## 2025-07-24 ENCOUNTER — PATIENT OUTREACH (OUTPATIENT)
Dept: CARE COORDINATION | Facility: CLINIC | Age: 20
End: 2025-07-24
Payer: COMMERCIAL

## 2025-07-24 NOTE — COMMUNITY RESOURCES LIST (ENGLISH)
Utilities  Bill-Pay Assistance   Program Provider: [facility]  Program Website : https://[facility]/overcome-poverty/  Program Phone Number: [phone]  Next Steps: get more info https://[email or web address].[facility].org/[facility]/contact-us/    Program Locations:   Address:  [address] [place] [address]   Distance:  4.49 mi   Office Phone Number: [phone]    Hours:   Monday: 8:00 AM - 5:00 PM   Tuesday: 8:00 AM - 5:00 PM   Wednesday: 8:00 AM - 5:00 PM   Thursday: 8:00 AM - 5:00 PM   Friday: 8:00 AM - 5:00 PM   Saturday: CLOSED   Sunday: CLOSED     Health Care Coverage Enrollment Assistance   Program Provider: [employer]  Program Website : [email or web address]/[email or web address]  Next Steps: Call [phone]    Program Locations:   Address:  [address] [place] [address]   Distance:  8.61 mi   Office Phone Number: [phone]    Hours:   Monday: 8:00 AM - 4:30 PM   Tuesday: 8:00 AM - 4:30 PM   Wednesday: 8:00 AM - 4:30 PM   Thursday: 8:00 AM - 4:30 PM   Friday: 8:00 AM - 4:30 PM   Saturday: CLOSED   Sunday: CLOSED     Energy Assistance Program (EAP)   Program Provider: [employer]  Program Website : https://[place].GoHealth/[employer]/consumers/consumer-assistance/energy-assistance/  Program Phone Number: [phone]  Next Steps: Go to https://mn.GoHealth/[employer]/consumers/consumer-assistance/energy-assistance/    Program Locations:   Address:  [address] [place] [address]   Distance:  10.09 mi   Office Phone Number: [phone]    Hours:   Monday: 8:00 AM - 5:00 PM   Tuesday: 8:00 AM - 5:00 PM   Wednesday: 8:00 AM - 5:00 PM   Thursday: 8:00 AM - 5:00 PM   Friday: 8:00 AM - 5:00 PM   Saturday: CLOSED   Sunday: CLOSED     Transportation  Transit Assistance Program (TAP)   Program Provider: [employer]  Program Website :  https://www.metrotransit.org/tap-riders?fbclid=[email or web address]  Next Steps: apply metrotransit.org/tap-enrollment-form    Program Locations:   Address:  [address] [place] [address]   Distance:  8.0 mi     Hours:   Monday: 8:00 AM - 5:00 PM   Tuesday: 8:00 AM - 5:00 PM   Wednesday: 8:00 AM - 5:00 PM   Thursday: 8:00 AM - 5:00 PM   Friday: 8:00 AM - 5:00 PM   Saturday: CLOSED   Sunday: CLOSED     [place] Non-Emergency Transportation Program (MNET)   Program Provider: [employer] (MTM) - [place]  Program Website : [email or web address]://www.mtm-inc.net/[place]/  Program Phone Number: [phone]  Next Steps: Call [phone]    Program Locations:   Address:  [address] [place] [address]   Distance:  8.01 mi   Office Phone Number: [phone]    Hours:   Monday: 7:00 AM - 6:00 PM   Tuesday: 7:00 AM - 6:00 PM   Wednesday: 7:00 AM - 6:00 PM   Thursday: 7:00 AM - 6:00 PM   Friday: 7:00 AM - 6:00 PM   Saturday: CLOSED   Sunday: CLOSED     Metro Mobility   Program Provider: [facility]  Program Website : https://metrocouncil.org/Transportation/Services/Metro-Mobility-Home.aspx  Program Phone Number: [phone]  Next Steps: Call [phone]    Program Locations:   Address:  [address] [place] [address]   Distance:  10.2 mi   Office Phone Number: [phone]    Hours:   Monday: 7:30 AM - 4:00 PM   Tuesday: 7:30 AM - 4:00 PM   Wednesday: 7:30 AM - 4:00 PM   Thursday: 7:30 AM - 4:00 PM   Friday: 7:30 AM - 4:00 PM   Saturday: CLOSED   Sunday: CLOSED     Transit Link   Program Provider: [facility]  Program Website : https://metrocouncil.org/Transportation/Services/Transit-Link.aspx  Program Phone Number: [phone]  Next Steps: Call [phone]    Program Locations:   Address:  [address] [place] [address]   Distance:  10.2 mi   Office Phone Number: [phone]    Hours:   Monday:  8:00 AM - 5:00 PM   Tuesday: 8:00 AM - 5:00 PM   Wednesday: 8:00 AM - 5:00 PM   Thursday: 8:00 AM - 5:00 PM   Friday: 8:00 AM - 5:00 PM   Saturday: CLOSED   Sunday: CLOSED     Healthcare Ground Transportation   Program Provider: [facility] (MMA)  Program Website : [email or web address]/request-assistance  Next Steps: apply [email or web address]/request-assistance    Program Locations:   Address:  [address] [place] [address]   Distance:  4220.07 mi   Office Phone Number: [phone]    Hours:   Monday: 9:00 AM - 5:30 PM   Tuesday: 9:00 AM - 5:30 PM   Wednesday: 9:00 AM - 5:30 PM   Thursday: 9:00 AM - 5:30 PM   Friday: 9:00 AM - 12:00 PM   Saturday: CLOSED   Sunday: CLOSED     Housing  HousingLink   Program Provider: [employer]  Program Website : https://www.housinglink.org/  Next Steps: Go to https://www.[email or web address]/    Program Locations:   Address:  [address] [place] [address]   Distance:  8.45 mi   Office Phone Number: [phone]    Hours:   Monday: 8:00 AM - 5:00 PM   Tuesday: 8:00 AM - 5:00 PM   Wednesday: 8:00 AM - 5:00 PM   Thursday: 8:00 AM - 5:00 PM   Friday: 8:00 AM - 5:00 PM   Saturday: CLOSED   Sunday: CLOSED     Affordable Housing Online   Program Provider: Affordable Housing Online  Program Website : https://[employer].Revolve./  Next Steps: Go to https://[employer].Revolve./    Program Locations:   Address:  [address] [place] [address]   Distance:  9882.24 mi     Hours:   Monday: 8:00 AM - 5:00 PM   Tuesday: 8:00 AM - 5:00 PM   Wednesday: 8:00 AM - 5:00 PM   Thursday: 8:00 AM - 5:00 PM   Friday: 8:00 AM - 5:00 PM   Saturday: CLOSED   Sunday: CLOSED

## 2025-07-24 NOTE — PROGRESS NOTES
[facility]/Voicemail    Clinical Data: Care Coordinator Outreach    Outreach Documentation Number of Outreach Attempt   7/24/2025   3:16 PM 1       Left message on patient's voicemail with call back information and requested return call.      Plan:   Care Coordinator will try to reach patient again in 10-15 business days.    Next outreach due: 8/12/25

## 2025-07-29 ENCOUNTER — PATIENT OUTREACH (OUTPATIENT)
Dept: CARE COORDINATION | Facility: CLINIC | Age: 20
End: 2025-07-29
Payer: COMMERCIAL

## 2025-07-29 ENCOUNTER — VIRTUAL VISIT (OUTPATIENT)
Dept: PSYCHIATRY | Facility: CLINIC | Age: 20
End: 2025-07-29
Payer: COMMERCIAL

## 2025-07-29 VITALS — BODY MASS INDEX: 36.83 KG/M2 | HEIGHT: 68 IN | WEIGHT: 243 LBS

## 2025-07-29 DIAGNOSIS — F32.A DEPRESSION, UNSPECIFIED DEPRESSION TYPE: Primary | ICD-10-CM

## 2025-07-29 DIAGNOSIS — G47.9 SLEEP DISTURBANCE: ICD-10-CM

## 2025-07-29 DIAGNOSIS — F90.9 ATTENTION DEFICIT HYPERACTIVITY DISORDER (ADHD), UNSPECIFIED ADHD TYPE: ICD-10-CM

## 2025-07-29 PROCEDURE — G2211 COMPLEX E/M VISIT ADD ON: HCPCS | Mod: 95

## 2025-07-29 PROCEDURE — 98006 SYNCH AUDIO-VIDEO EST MOD 30: CPT

## 2025-07-29 ASSESSMENT — PAIN SCALES - GENERAL: PAINLEVEL_OUTOF10: NO PAIN (0)

## 2025-07-29 NOTE — PATIENT INSTRUCTIONS
Plan:  -Continue clonidine 0.3mg nightly as needed   -Start Zoloft 50mg daily     **For crisis resources, please see the information at the end of this document**   Patient Education    Thank you for coming to the [facility].     Lab Testing:  If you had lab testing today and your results are reassuring or normal they will be mailed to you or sent through [employer]omial within 7 days. If the lab tests need quick action we will call you with the results. The phone number we will call with results is # [phone]. If this is not the best number please call our clinic and change the number.     Medication Refills:  If you need any refills please call your pharmacy and they will contact us. Our fax number for refills is [phone].   Three business days of notice are needed for general medication refill requests.   Five business days of notice are needed for controlled substance refill requests.   If you need to change to a different pharmacy, please contact the new pharmacy directly. The new pharmacy will help you get your medications transferred.     Contact Us:  Please call [phone] during business hours (8-5:00 M-F).   If you have medication related questions after clinic hours, or on the weekend, please call [phone].     Financial Assistance [phone]   Medical Records [phone]       MENTAL HEALTH CRISIS RESOURCES:  For a emergency help, please call 911 or go to the nearest Emergency Department.     Emergency Walk-In Options:   EmPATH Unit @ [facility] ([place]): [phone] - Specialized mental health emergency area designed to be calming  [facility] ([place]): [phone]  [facility] ([place]): [phone]  [facility]): [phone]    [facility] Crisis Information:   [place]: [phone]  [place]: [phone]  [place] ([place]) - Adult: [phone]     Child: [phone]  [place]  - Adult: [phone]     Child: [phone]  [place]: [phone]  List of all [facility] resources:   https://[place].gov/dhs/people-we-serve/adults/health-care/mental-health/resources/crisis-contacts.jsp    National Crisis Information:   Crisis Text Line: Text  [place]  to [address]01  Suicide & Crisis Lifeline: 988  National Suicide Prevention Lifeline: 3-871-884-TALK (1-872.311.1588)       For online chat options, visit https://suicidepreventionlifeline.org/chat/  Poison Control Center: 3-333-930-0989  Trans Lifeline: 0-015-238-6281 - Hotline for transgender people of all ages  The Toni Project: 6-789-627-1711 - Hotline for LGBT youth     For Non-Emergency Support:   Fast Tracker: Mental Health & Substance Use Disorder Resources -   https://www.leemailtrackermn.org/

## 2025-07-29 NOTE — PROGRESS NOTES
Clinic Care Coordination Contact    Situation: Patient chart reviewed by care coordinator.    Background: Patient is enrolled     Assessment: CHW attempted to call patient, will call again mid August     Plan/Recommendations: SW will review in 3-4 weeks     DEONTE Garrett, MSW   [facility]  Care Coordination  [facility]  [phone]  7/29/2025 8:20 AM
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
## 2025-07-29 NOTE — LETTER
[facility] COORDINATION  [address] [facility] [address]     July 29, 2025        Yessy Currie  [address] [facility] [address]          Dear Remedios Mattson is an updated Patient Centered Plan of Care for your continued enrollment in Care Coordination. Please let us know if you have additional questions, concerns, or goals that we can assist with.    Sincerely,    Jane Raman, LSW, MSW Clinic   [facility]  Care [facility] and [facility]   [email or web address]  Office: [phone]  Employed by [employer] [facility]  Patient Centered Plan of Care  About Me:        Patient Name:  Srinivasan Currie    YOB: 2005  Age:         20 year old   [place] MRN:    [id number] Telephone Information:  Home Phone [phone]   Mobile [phone]       Address:  [address] [facility] [address] Email address:  [email or web address]      Emergency Contact(s)    Name Relationship Lgl Grd Work Phone Home Phone Mobile Phone   1. JOEL BENITEZ* Mother  [phone]   2. SHARMAINE CURRIE Father   [phone]           Primary language:  English     needed? No   [facility] [employer]:  [phone] op. 1  Other communication barriers:None    Preferred Method of Communication:  Phone, MyChart  Current living arrangement: I live alone    Mobility Status/ Medical Equipment: Independent        Health Maintenance  Health Maintenance Reviewed: Due/Overdue   Health Maintenance Due   Topic Date Due    COVID-19 VACCINE (1 - 2024-25 season) Never done          My Access Plan  Medical Emergency 911   Primary Clinic [facility] - [phone]   24 Hour Appointment Line [phone] or  [phone]) (toll-free)   24 Hour Nurse Line [phone] (toll-free)   Preferred Urgent Care [facility]  [facility], [phone]     Preferred Hospital [facility]  [phone]     Preferred Pharmacy [facility] - [address]     Behavioral Health Crisis Line The National Suicide Prevention Lifeline at 1-777.370.9739 or Text/Call 988           My Care Team Members  Patient Care Team         Relationship Specialty Notifications Start End    Peter Melo APRN CNP PCP - General Family Medicine  3/18/24     Phone: [phone] Fax: [phone]         [address] [place] [address]    Catina Forrester MD MD OB/Gyn  1/23/24     Phone: [phone] Fax: [phone]         [address] [place] [address]    Rome Pillai PA Physician Assistant Otolaryngology  5/13/24     Referred to allergy    Phone: [phone] Fax: [phone]         [address] [place] [address] [address] [facility] [address]    Kevin Harrison MD MD Allergy & Immunology  6/3/24     Phone: [phone] Fax: [phone] [address] [facility] [address]    Peter Melo APRN CNP Assigned PCP   9/23/24     Phone: [phone] Fax: [phone]         [address] [place] [address]    Alesha Hernández APRN CNP Assigned Behavioral Health Provider   12/23/24     Phone: [phone] Fax: [phone] [address] [facility] [address] [facility] [address]    Rome Pillai PA Assigned Surgical Provider   3/23/25     Phone: [phone] Fax: [phone]         [address] [place] [address] [address] [facility] [address]    Jane Raman BSW Lead Care Coordinator Primary Care - [facility] Admissions 5/23/25     Phone: [phone] Fax: [phone]        Lozano, Sirena A, CHW Community Health Worker Primary Care - [facility] Admissions 5/28/25     Phone: [phone] Fax: [phone]                    My Care Plans  Self Management and Treatment Plan    Care Plan  Care Plan: Mental Health       Problem: Mental Health Symptoms Need  Improvement       Goal: Improve management of mental health symptoms and establish with mental health/psychosocial supports       Start Date: 5/27/2025 Expected End Date: 10/1/2025    This Visit's Progress: 50% Recent Progress: 40%    Priority: High    Note:     Barriers: physical limitations for current employment, desire for a career change, chronic pain  Strengths: initial visit with therapy 5/13/2025  Patient expressed understanding of goal: yes  Action steps to achieve this goal:  1. I continue to attend therapy appts   2. I will take medications as prescribed   3. I will request further support resources as needs arise                             Care Plan: Employment       Problem: Needs alternate employment       Goal: I will research resources at [employer]       Start Date: 5/27/2025 Expected End Date: 10/1/2025    Recent Progress: 30%    Priority: Medium    Note:     Barriers: current employment physically very difficult   Strengths: motivated to research options   Patient expressed understanding of goal: yes  Action steps to achieve this goal:  1. I will find time to call [employer] to discuss options that are virtual.  2. I will discuss obtaining an appt to discuss my current needs with employment   3. I will attend classes they offer regarding employment needs     Currently employed at [employer].                                Action Plans on File: none                      Advance Care Plans/Directives:   Advanced Care Plan/Directives on file: No    Discussed with patient/caregiver(s): Declined Further Information             My Medical and Care Information  Problem List   Patient Active Problem List   Diagnosis    Keloid scar    Parent-child conflict    ASCUS with positive high risk HPV cervical    Moderate episode of recurrent major depressive disorder (H)      Current Medications:  Please refer to the most recent medication list provided to you by your medical team and reach out to  your provider with any questions or to make any corrections.    Care Coordination Start Date: 5/22/2025   Frequency of Care Coordination: monthly, more frequently as needed     Form Last Updated: 07/29/2025

## 2025-07-29 NOTE — PROGRESS NOTES
Virtual Visit Details    Type of service:  Video Visit   Video Start Time: 0935  Video End Time: 0955    Originating Location (pt. Location): Home    Distant Location (provider location):  Off-site  Platform used for Video Visit: [employer]

## 2025-07-29 NOTE — NURSING NOTE
Current patient location: [address] [facility] [address]    Is the patient currently in the state of [place]? YES    Visit mode: VIDEO    If the visit is dropped, the patient can be reconnected by:VIDEO VISIT: Text to cell phone:   Telephone Information:   Mobile [phone]       Will anyone else be joining the visit? NO  (If patient encounters technical issues they should call [phone]    Are changes needed to the allergy or medication list? No    Are refills needed on medications prescribed by this physician? NO    Rooming Documentation:  Patient will complete questionnaire(s) in [facility]    Reason for visit: RECHECK    Quyen CELAYAF

## 2025-07-31 ENCOUNTER — ANCILLARY PROCEDURE (OUTPATIENT)
Dept: MRI IMAGING | Facility: CLINIC | Age: 20
End: 2025-07-31
Attending: STUDENT IN AN ORGANIZED HEALTH CARE EDUCATION/TRAINING PROGRAM
Payer: COMMERCIAL

## 2025-08-01 ENCOUNTER — PATIENT OUTREACH (OUTPATIENT)
Dept: CARE COORDINATION | Facility: CLINIC | Age: 20
End: 2025-08-01
Payer: COMMERCIAL

## 2025-08-05 ENCOUNTER — TELEPHONE (OUTPATIENT)
Dept: FAMILY MEDICINE | Facility: CLINIC | Age: 20
End: 2025-08-05

## 2025-08-05 ENCOUNTER — VIRTUAL VISIT (OUTPATIENT)
Dept: FAMILY MEDICINE | Facility: CLINIC | Age: 20
End: 2025-08-05
Payer: OTHER MISCELLANEOUS

## 2025-08-05 DIAGNOSIS — G89.29 CHRONIC RIGHT-SIDED LOW BACK PAIN WITH RIGHT-SIDED SCIATICA: ICD-10-CM

## 2025-08-05 DIAGNOSIS — Z71.3 WEIGHT LOSS COUNSELING, ENCOUNTER FOR: ICD-10-CM

## 2025-08-05 DIAGNOSIS — M54.16 LUMBAR RADICULOPATHY: Primary | ICD-10-CM

## 2025-08-05 DIAGNOSIS — F90.2 ADHD (ATTENTION DEFICIT HYPERACTIVITY DISORDER), COMBINED TYPE: ICD-10-CM

## 2025-08-05 DIAGNOSIS — M54.41 CHRONIC RIGHT-SIDED LOW BACK PAIN WITH RIGHT-SIDED SCIATICA: ICD-10-CM

## 2025-08-05 PROCEDURE — 98006 SYNCH AUDIO-VIDEO EST MOD 30: CPT | Performed by: STUDENT IN AN ORGANIZED HEALTH CARE EDUCATION/TRAINING PROGRAM

## 2025-08-05 RX ORDER — PREDNISONE 20 MG/1
40 TABLET ORAL
COMMUNITY
Start: 2025-07-31 | End: 2025-08-05

## 2025-08-05 RX ORDER — BUPROPION HYDROCHLORIDE 75 MG/1
75 TABLET ORAL 2 TIMES DAILY
Qty: 60 TABLET | Refills: 1 | Status: SHIPPED | OUTPATIENT
Start: 2025-08-05

## 2025-08-05 RX ORDER — METHOCARBAMOL 500 MG/1
1000 TABLET, FILM COATED ORAL
Qty: 30 TABLET | Refills: 0 | Status: SHIPPED | OUTPATIENT
Start: 2025-08-05

## 2025-08-05 RX ORDER — LIDOCAINE 4 G/G
PATCH TOPICAL
COMMUNITY
Start: 2025-07-31 | End: 2025-08-05

## 2025-08-05 RX ORDER — LIDOCAINE 4 G/G
PATCH TOPICAL EVERY 24 HOURS
Qty: 15 PATCH | Refills: 3 | Status: SHIPPED | OUTPATIENT
Start: 2025-08-05

## 2025-08-06 PROBLEM — G89.29 CHRONIC RIGHT-SIDED LOW BACK PAIN WITH RIGHT-SIDED SCIATICA: Status: ACTIVE | Noted: 2025-08-06

## 2025-08-06 PROBLEM — Z71.3 WEIGHT LOSS COUNSELING, ENCOUNTER FOR: Status: ACTIVE | Noted: 2025-08-06

## 2025-08-06 PROBLEM — F90.2 ADHD (ATTENTION DEFICIT HYPERACTIVITY DISORDER), COMBINED TYPE: Status: ACTIVE | Noted: 2025-08-06

## 2025-08-06 PROBLEM — M54.41 CHRONIC RIGHT-SIDED LOW BACK PAIN WITH RIGHT-SIDED SCIATICA: Status: ACTIVE | Noted: 2025-08-06

## 2025-08-06 PROBLEM — M54.16 LUMBAR RADICULOPATHY: Status: ACTIVE | Noted: 2025-08-06

## 2025-08-07 ENCOUNTER — THERAPY VISIT (OUTPATIENT)
Dept: PHYSICAL THERAPY | Facility: CLINIC | Age: 20
End: 2025-08-07
Attending: STUDENT IN AN ORGANIZED HEALTH CARE EDUCATION/TRAINING PROGRAM
Payer: OTHER MISCELLANEOUS

## 2025-08-07 DIAGNOSIS — M54.16 LUMBAR RADICULOPATHY: ICD-10-CM

## 2025-08-11 ENCOUNTER — THERAPY VISIT (OUTPATIENT)
Dept: PHYSICAL THERAPY | Facility: CLINIC | Age: 20
End: 2025-08-11
Attending: STUDENT IN AN ORGANIZED HEALTH CARE EDUCATION/TRAINING PROGRAM
Payer: OTHER MISCELLANEOUS

## 2025-08-11 DIAGNOSIS — M54.16 LUMBAR RADICULOPATHY: Primary | ICD-10-CM

## 2025-08-11 DIAGNOSIS — M54.41 CHRONIC RIGHT-SIDED LOW BACK PAIN WITH RIGHT-SIDED SCIATICA: ICD-10-CM

## 2025-08-11 DIAGNOSIS — G89.29 CHRONIC RIGHT-SIDED LOW BACK PAIN WITH RIGHT-SIDED SCIATICA: ICD-10-CM

## 2025-08-11 PROCEDURE — 97110 THERAPEUTIC EXERCISES: CPT | Mod: GP | Performed by: PHYSICAL THERAPIST

## 2025-08-11 PROCEDURE — 97530 THERAPEUTIC ACTIVITIES: CPT | Mod: GP | Performed by: PHYSICAL THERAPIST

## 2025-09-02 ENCOUNTER — PATIENT OUTREACH (OUTPATIENT)
Dept: CARE COORDINATION | Facility: CLINIC | Age: 20
End: 2025-09-02
Payer: COMMERCIAL

## 2025-09-04 ENCOUNTER — OFFICE VISIT (OUTPATIENT)
Dept: FAMILY MEDICINE | Facility: CLINIC | Age: 20
End: 2025-09-04
Payer: OTHER MISCELLANEOUS

## 2025-09-04 VITALS
RESPIRATION RATE: 16 BRPM | TEMPERATURE: 97.8 F | BODY MASS INDEX: 38.65 KG/M2 | OXYGEN SATURATION: 97 % | HEIGHT: 68 IN | HEART RATE: 87 BPM | SYSTOLIC BLOOD PRESSURE: 122 MMHG | DIASTOLIC BLOOD PRESSURE: 83 MMHG | WEIGHT: 255 LBS

## 2025-09-04 DIAGNOSIS — M51.369 BULGING LUMBAR DISC: ICD-10-CM
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
## 2025-09-04 DIAGNOSIS — M48.061 SPINAL STENOSIS OF LUMBAR REGION, UNSPECIFIED WHETHER NEUROGENIC CLAUDICATION PRESENT: ICD-10-CM

## 2025-09-04 DIAGNOSIS — F90.2 ADHD (ATTENTION DEFICIT HYPERACTIVITY DISORDER), COMBINED TYPE: ICD-10-CM

## 2025-09-04 DIAGNOSIS — M54.16 LUMBAR RADICULOPATHY: Primary | ICD-10-CM

## 2025-09-04 DIAGNOSIS — Z71.3 WEIGHT LOSS COUNSELING, ENCOUNTER FOR: ICD-10-CM

## 2025-09-04 RX ORDER — NAPROXEN 500 MG/1
500 TABLET ORAL 2 TIMES DAILY WITH MEALS
Qty: 30 TABLET | Refills: 0 | Status: SHIPPED | OUTPATIENT
Start: 2025-09-04

## 2025-09-04 RX ORDER — BUPROPION HYDROCHLORIDE 75 MG/1
75 TABLET ORAL DAILY
Qty: 60 TABLET | Refills: 1 | Status: SHIPPED | OUTPATIENT
Start: 2025-09-04

## 2025-09-04 RX ORDER — LISDEXAMFETAMINE DIMESYLATE 30 MG/1
30 CAPSULE ORAL EVERY MORNING
Qty: 30 CAPSULE | Refills: 0 | Status: SHIPPED | OUTPATIENT
Start: 2025-09-04

## 2025-09-04 ASSESSMENT — ENCOUNTER SYMPTOMS: BACK PAIN: 1

## 2025-09-04 ASSESSMENT — ANXIETY QUESTIONNAIRES
1. FEELING NERVOUS, ANXIOUS, OR ON EDGE: NEARLY EVERY DAY
6. BECOMING EASILY ANNOYED OR IRRITABLE: NEARLY EVERY DAY
7. FEELING AFRAID AS IF SOMETHING AWFUL MIGHT HAPPEN: MORE THAN HALF THE DAYS
7. FEELING AFRAID AS IF SOMETHING AWFUL MIGHT HAPPEN: MORE THAN HALF THE DAYS
8. IF YOU CHECKED OFF ANY PROBLEMS, HOW DIFFICULT HAVE THESE MADE IT FOR YOU TO DO YOUR WORK, TAKE CARE OF THINGS AT HOME, OR GET ALONG WITH OTHER PEOPLE?: VERY DIFFICULT
IF YOU CHECKED OFF ANY PROBLEMS ON THIS QUESTIONNAIRE, HOW DIFFICULT HAVE THESE PROBLEMS MADE IT FOR YOU TO DO YOUR WORK, TAKE CARE OF THINGS AT HOME, OR GET ALONG WITH OTHER PEOPLE: VERY DIFFICULT
GAD7 TOTAL SCORE: 19
2. NOT BEING ABLE TO STOP OR CONTROL WORRYING: NEARLY EVERY DAY
3. WORRYING TOO MUCH ABOUT DIFFERENT THINGS: NEARLY EVERY DAY
5. BEING SO RESTLESS THAT IT IS HARD TO SIT STILL: MORE THAN HALF THE DAYS
4. TROUBLE RELAXING: NEARLY EVERY DAY

## 2025-09-04 ASSESSMENT — PATIENT HEALTH QUESTIONNAIRE - PHQ9
SUM OF ALL RESPONSES TO PHQ QUESTIONS 1-9: 18
SUM OF ALL RESPONSES TO PHQ QUESTIONS 1-9: 18
10. IF YOU CHECKED OFF ANY PROBLEMS, HOW DIFFICULT HAVE THESE PROBLEMS MADE IT FOR YOU TO DO YOUR WORK, TAKE CARE OF THINGS AT HOME, OR GET ALONG WITH OTHER PEOPLE: VERY DIFFICULT

## 2025-09-04 ASSESSMENT — PAIN SCALES - GENERAL: PAINLEVEL_OUTOF10: NO PAIN (0)
